# Patient Record
Sex: FEMALE | Race: WHITE | NOT HISPANIC OR LATINO
[De-identification: names, ages, dates, MRNs, and addresses within clinical notes are randomized per-mention and may not be internally consistent; named-entity substitution may affect disease eponyms.]

---

## 2019-08-03 ENCOUNTER — TRANSCRIPTION ENCOUNTER (OUTPATIENT)
Age: 84
End: 2019-08-03

## 2019-09-05 ENCOUNTER — OUTPATIENT (OUTPATIENT)
Dept: OUTPATIENT SERVICES | Facility: HOSPITAL | Age: 84
LOS: 1 days | Discharge: HOME | End: 2019-09-05

## 2019-09-05 DIAGNOSIS — E78.1 PURE HYPERGLYCERIDEMIA: ICD-10-CM

## 2019-09-05 DIAGNOSIS — K81.9 CHOLECYSTITIS, UNSPECIFIED: ICD-10-CM

## 2019-09-05 DIAGNOSIS — N39.0 URINARY TRACT INFECTION, SITE NOT SPECIFIED: ICD-10-CM

## 2019-09-05 DIAGNOSIS — D64.9 ANEMIA, UNSPECIFIED: ICD-10-CM

## 2019-09-05 DIAGNOSIS — E03.9 HYPOTHYROIDISM, UNSPECIFIED: ICD-10-CM

## 2019-09-06 ENCOUNTER — OUTPATIENT (OUTPATIENT)
Dept: OUTPATIENT SERVICES | Facility: HOSPITAL | Age: 84
LOS: 1 days | Discharge: HOME | End: 2019-09-06

## 2019-09-06 DIAGNOSIS — N39.0 URINARY TRACT INFECTION, SITE NOT SPECIFIED: ICD-10-CM

## 2021-10-29 ENCOUNTER — INPATIENT (INPATIENT)
Facility: HOSPITAL | Age: 86
LOS: 9 days | Discharge: NURSING, REHAB OR LONG-TERM CARE | End: 2021-11-08
Attending: INTERNAL MEDICINE | Admitting: INTERNAL MEDICINE
Payer: MEDICARE

## 2021-10-29 VITALS
RESPIRATION RATE: 16 BRPM | OXYGEN SATURATION: 100 % | SYSTOLIC BLOOD PRESSURE: 177 MMHG | TEMPERATURE: 98 F | WEIGHT: 100.09 LBS | DIASTOLIC BLOOD PRESSURE: 77 MMHG | HEART RATE: 94 BPM

## 2021-10-29 LAB
ALBUMIN SERPL ELPH-MCNC: 4.5 G/DL — SIGNIFICANT CHANGE UP (ref 3.5–5.2)
ALP SERPL-CCNC: 57 U/L — SIGNIFICANT CHANGE UP (ref 30–115)
ALT FLD-CCNC: 12 U/L — SIGNIFICANT CHANGE UP (ref 0–41)
ANION GAP SERPL CALC-SCNC: 13 MMOL/L — SIGNIFICANT CHANGE UP (ref 7–14)
APTT BLD: 29.7 SEC — SIGNIFICANT CHANGE UP (ref 27–39.2)
AST SERPL-CCNC: 33 U/L — SIGNIFICANT CHANGE UP (ref 0–41)
BASOPHILS # BLD AUTO: 0.04 K/UL — SIGNIFICANT CHANGE UP (ref 0–0.2)
BASOPHILS NFR BLD AUTO: 0.3 % — SIGNIFICANT CHANGE UP (ref 0–1)
BILIRUB SERPL-MCNC: 0.6 MG/DL — SIGNIFICANT CHANGE UP (ref 0.2–1.2)
BLD GP AB SCN SERPL QL: SIGNIFICANT CHANGE UP
BUN SERPL-MCNC: 14 MG/DL — SIGNIFICANT CHANGE UP (ref 10–20)
CALCIUM SERPL-MCNC: 9.3 MG/DL — SIGNIFICANT CHANGE UP (ref 8.5–10.1)
CHLORIDE SERPL-SCNC: 103 MMOL/L — SIGNIFICANT CHANGE UP (ref 98–110)
CO2 SERPL-SCNC: 21 MMOL/L — SIGNIFICANT CHANGE UP (ref 17–32)
CREAT SERPL-MCNC: 0.8 MG/DL — SIGNIFICANT CHANGE UP (ref 0.7–1.5)
EOSINOPHIL # BLD AUTO: 0.12 K/UL — SIGNIFICANT CHANGE UP (ref 0–0.7)
EOSINOPHIL NFR BLD AUTO: 0.9 % — SIGNIFICANT CHANGE UP (ref 0–8)
ETHANOL SERPL-MCNC: <10 MG/DL — SIGNIFICANT CHANGE UP
GLUCOSE SERPL-MCNC: 174 MG/DL — HIGH (ref 70–99)
HCT VFR BLD CALC: 42.1 % — SIGNIFICANT CHANGE UP (ref 37–47)
HGB BLD-MCNC: 13.7 G/DL — SIGNIFICANT CHANGE UP (ref 12–16)
IMM GRANULOCYTES NFR BLD AUTO: 0.5 % — HIGH (ref 0.1–0.3)
INR BLD: 0.94 RATIO — SIGNIFICANT CHANGE UP (ref 0.65–1.3)
LACTATE SERPL-SCNC: 1.9 MMOL/L — SIGNIFICANT CHANGE UP (ref 0.7–2)
LIDOCAIN IGE QN: 49 U/L — SIGNIFICANT CHANGE UP (ref 7–60)
LYMPHOCYTES # BLD AUTO: 1.04 K/UL — LOW (ref 1.2–3.4)
LYMPHOCYTES # BLD AUTO: 7.6 % — LOW (ref 20.5–51.1)
MCHC RBC-ENTMCNC: 29.8 PG — SIGNIFICANT CHANGE UP (ref 27–31)
MCHC RBC-ENTMCNC: 32.5 G/DL — SIGNIFICANT CHANGE UP (ref 32–37)
MCV RBC AUTO: 91.7 FL — SIGNIFICANT CHANGE UP (ref 81–99)
MONOCYTES # BLD AUTO: 0.42 K/UL — SIGNIFICANT CHANGE UP (ref 0.1–0.6)
MONOCYTES NFR BLD AUTO: 3.1 % — SIGNIFICANT CHANGE UP (ref 1.7–9.3)
NEUTROPHILS # BLD AUTO: 12.07 K/UL — HIGH (ref 1.4–6.5)
NEUTROPHILS NFR BLD AUTO: 87.6 % — HIGH (ref 42.2–75.2)
NRBC # BLD: 0 /100 WBCS — SIGNIFICANT CHANGE UP (ref 0–0)
PLATELET # BLD AUTO: 243 K/UL — SIGNIFICANT CHANGE UP (ref 130–400)
POTASSIUM SERPL-MCNC: 5.2 MMOL/L — HIGH (ref 3.5–5)
POTASSIUM SERPL-SCNC: 5.2 MMOL/L — HIGH (ref 3.5–5)
PROT SERPL-MCNC: 7.2 G/DL — SIGNIFICANT CHANGE UP (ref 6–8)
PROTHROM AB SERPL-ACNC: 10.8 SEC — SIGNIFICANT CHANGE UP (ref 9.95–12.87)
RBC # BLD: 4.59 M/UL — SIGNIFICANT CHANGE UP (ref 4.2–5.4)
RBC # FLD: 13.7 % — SIGNIFICANT CHANGE UP (ref 11.5–14.5)
SODIUM SERPL-SCNC: 137 MMOL/L — SIGNIFICANT CHANGE UP (ref 135–146)
WBC # BLD: 13.76 K/UL — HIGH (ref 4.8–10.8)
WBC # FLD AUTO: 13.76 K/UL — HIGH (ref 4.8–10.8)

## 2021-10-29 PROCEDURE — 93010 ELECTROCARDIOGRAM REPORT: CPT

## 2021-10-29 PROCEDURE — 71045 X-RAY EXAM CHEST 1 VIEW: CPT | Mod: 26

## 2021-10-29 PROCEDURE — 73552 X-RAY EXAM OF FEMUR 2/>: CPT | Mod: 26,LT

## 2021-10-29 PROCEDURE — 72192 CT PELVIS W/O DYE: CPT | Mod: 26,MA

## 2021-10-29 PROCEDURE — 99285 EMERGENCY DEPT VISIT HI MDM: CPT

## 2021-10-29 PROCEDURE — 73502 X-RAY EXAM HIP UNI 2-3 VIEWS: CPT | Mod: 26,LT

## 2021-10-29 PROCEDURE — 99221 1ST HOSP IP/OBS SF/LOW 40: CPT | Mod: GC,AI

## 2021-10-29 RX ORDER — MORPHINE SULFATE 50 MG/1
4 CAPSULE, EXTENDED RELEASE ORAL ONCE
Refills: 0 | Status: DISCONTINUED | OUTPATIENT
Start: 2021-10-29 | End: 2021-10-29

## 2021-10-29 RX ORDER — SODIUM CHLORIDE 9 MG/ML
250 INJECTION INTRAMUSCULAR; INTRAVENOUS; SUBCUTANEOUS ONCE
Refills: 0 | Status: COMPLETED | OUTPATIENT
Start: 2021-10-29 | End: 2021-10-29

## 2021-10-29 RX ORDER — TRAMADOL HYDROCHLORIDE 50 MG/1
50 TABLET ORAL EVERY 6 HOURS
Refills: 0 | Status: DISCONTINUED | OUTPATIENT
Start: 2021-10-29 | End: 2021-11-01

## 2021-10-29 RX ORDER — LISINOPRIL 2.5 MG/1
5 TABLET ORAL DAILY
Refills: 0 | Status: DISCONTINUED | OUTPATIENT
Start: 2021-10-29 | End: 2021-11-01

## 2021-10-29 RX ORDER — ACETAMINOPHEN 500 MG
1000 TABLET ORAL EVERY 6 HOURS
Refills: 0 | Status: DISCONTINUED | OUTPATIENT
Start: 2021-10-29 | End: 2021-10-30

## 2021-10-29 RX ORDER — HEPARIN SODIUM 5000 [USP'U]/ML
5000 INJECTION INTRAVENOUS; SUBCUTANEOUS EVERY 12 HOURS
Refills: 0 | Status: DISCONTINUED | OUTPATIENT
Start: 2021-10-29 | End: 2021-11-01

## 2021-10-29 RX ADMIN — MORPHINE SULFATE 4 MILLIGRAM(S): 50 CAPSULE, EXTENDED RELEASE ORAL at 21:53

## 2021-10-29 RX ADMIN — SODIUM CHLORIDE 250 MILLILITER(S): 9 INJECTION INTRAMUSCULAR; INTRAVENOUS; SUBCUTANEOUS at 20:40

## 2021-10-29 NOTE — ED ADULT NURSE NOTE - OBJECTIVE STATEMENT
89 y/o female presents to ED s/p fall. pt found unable to get up. pt denies LOC, head injury, or use of anticoagulants. pt c/o neck pain and left leg pain.

## 2021-10-29 NOTE — ED PROVIDER NOTE - OBJECTIVE STATEMENT
89y F with PMH of HTN presents with CC of fall. Patient reports that she was wearing old slippers, and slipped on her right leg and landed on her left side. Complaining of left hip pain. No head trauma, no LOC, no chest pain, no SOB. no abdominal pain, No N/V/D.

## 2021-10-29 NOTE — H&P ADULT - NSHPPHYSICALEXAM_GEN_ALL_CORE
GEN: NAD, Well Appearing, Well nourished  HEENT: NCAT, PERRL, EOMI, No Icterus, No Pallor, No nystagmus, Vision Intact, No JVD/TD  CV/CHEST: RRR, +S1/S2, no murmurs  PULM: CTAB, Good Air Entry Bilaterally  ABD: SNTTP, ND x 4 Q's  EXT: LLE shortened slightly externally rotated, EXT otherwise are Warm, Well Perfused x 4. No Edema  SKIN: No Rash  NEURO: AxOx3, + Normal Affect

## 2021-10-29 NOTE — H&P ADULT - ATTENDING COMMENTS
REVIEW OF SYSTEMS: see cc/HPI   CONSTITUTIONAL: No weakness, fevers or chills, (+) pain   EYES/ENT: No visual changes;  No vertigo or throat pain   NECK: No pain or stiffness  RESPIRATORY: No cough, wheezing, hemoptysis; No shortness of breath  CARDIOVASCULAR: No chest pain or palpitations  GASTROINTESTINAL: No abdominal or epigastric pain. No nausea, vomiting, or hematemesis; No diarrhea or constipation. No melena or hematochezia.  GENITOURINARY: No dysuria, frequency or hematuria  NEUROLOGICAL: No numbness or weakness  SKIN: No itching, rashes    Functional Capacity - patient fully independent, drives, shops/carries groceries to car and has Fx capacity > 4 METS      Physical Exam:  General: WN/WD NAD, elderly   Neurology: A&Ox3, nonfocal, follows commands  Eyes: PERRLA/ EOMI  ENT/Neck: Neck supple, trachea midline, No JVD  Respiratory: CTA B/L, No wheezing, rales, rhonchi  CV: Normal rate regular rhythm, S1S2, no murmurs, rubs or gallops  Abdominal: Soft, NT, ND +BS,   Extremities: No edema, + peripheral pulses, LLE externally rotated and shortened, pain w/ movement   Skin: No Rashes, Hematoma, Ecchymosis  Incisions:   Tubes:    A/p  L hip fracture   Mechanical fall  -patient is at moderate risk svetlana-op risk for complication while undergoing an intermediate risk procedure - no contra-indication for surgery and no further testing needed   -PRN pain Rx   -NPO after MN Sunday for OR Monday  -NWB / Fall precautions for now    -Ortho eval and follow up   -PT/ Rehab eval REVIEW OF SYSTEMS: see cc/HPI   CONSTITUTIONAL: No weakness, fevers or chills, (+) pain   EYES/ENT: No visual changes;  No vertigo or throat pain   NECK: No pain or stiffness  RESPIRATORY: No cough, wheezing, hemoptysis; No shortness of breath  CARDIOVASCULAR: No chest pain or palpitations  GASTROINTESTINAL: No abdominal or epigastric pain. No nausea, vomiting, or hematemesis; No diarrhea or constipation. No melena or hematochezia.  GENITOURINARY: No dysuria, frequency or hematuria  NEUROLOGICAL: No numbness or weakness  SKIN: No itching, rashes    Functional Capacity - patient fully independent, drives, shops/carries groceries to car and has Fx capacity > 4 METS    < from: Xray Chest 1 View AP/PA (10.29.21 @ 21:13) >  Impression:  No radiographic evidence of acute cardiopulmonary disease.  --- End of Report ---  < end of copied text >    < from: Xray Hip 2-3 Views, Left (10.29.21 @ 21:23) >  IMPRESSION:  Acute, displaced left femoral neck fracture.  --- End of Report ---  < end of copied text >    Physical Exam:  General: WN/WD NAD, elderly   Neurology: A&Ox3, nonfocal, follows commands  Eyes: PERRLA/ EOMI  ENT/Neck: Neck supple, trachea midline, No JVD  Respiratory: CTA B/L, No wheezing, rales, rhonchi  CV: Normal rate regular rhythm, S1S2, no murmurs, rubs or gallops  Abdominal: Soft, NT, ND +BS,   Extremities: No edema, + peripheral pulses, LLE externally rotated and shortened, pain w/ movement   Skin: No Rashes, Hematoma, Ecchymosis  Incisions:   Tubes:    A/p  L hip fracture   Mechanical fall  -patient is at moderate risk svetlana-op risk for complication while undergoing an intermediate risk procedure - no contra-indication for surgery and no further testing needed   -PRN pain Rx   -NPO after MN Sunday for OR Monday  -NWB / Fall precautions for now    -Ortho eval and follow up   -PT/ Rehab eval    DVT prophylaxis

## 2021-10-29 NOTE — H&P ADULT - ASSESSMENT
ASSESSMENT  # Mechanical Fall  # L Femur Fx  # HTN    PLAN  # Mechanical Fall  # L Femur Fx  - ORIF Monday, NPO After Midnight  - Preop Labs  - Ghislaine Periop VALENTIN Score 0.3%  - Scanlon Postop Resp Failure Risk 0.3%  - RCRI 3.9 %  - Patient is Moderate Risk for a Moderate Risk Procedure (Age, HTN)    # HTN  -     CODE:  DIET:  DVT PPX:   GI PPX:  DISPO: ASSESSMENT  # Mechanical Fall  # L Femur Fx  # HTN    PLAN  # Mechanical Fall  # L Femur Fx  - ORIF Monday, NPO After Midnight  - Preop Labs  - Ghislaine Periop VALENTIN Score 0.3%  - Scanlon Postop Resp Failure Risk 0.3%  - RCRI 3.9 %  - Patient is Moderate Risk for a Moderate Risk Procedure (Age, HTN)  - PT consult placed, NWB LLE, WBAT RLE  - Ortho consult   - Tylenol 1000mg Q6hrs PRN for mild-mod pain  - Tramadol 50mg Q6hrs PRN for Sever pain    # HTN  - Lisinopril 5mg QD    CODE: FULL  DIET: DASH/TLC  DVT PPX: HSQ  GI PPX: n/a  DISPO: Acute     Discussed w/son of pt Dr. CHAR Harrington (orthopaedic surgeon) he is coming down to [facility] from [facility] tomorrow

## 2021-10-29 NOTE — ED ADULT TRIAGE NOTE - CHIEF COMPLAINT QUOTE
pt s/p single mechanical fall landing on L side. (-) head trauma, (-) LOC, (-) A/C. pt pelvis stable on exam, pt c/o L femoral head/neck pain to palpation in triage.

## 2021-10-29 NOTE — ED PROVIDER NOTE - PHYSICAL EXAMINATION
VITALS: Reviewed  CONSTITUTIONAL: well developed, well nourished, in no acute distress, speaking in full sentences, nontoxic appearing  SKIN: warm, dry, no rash  HEAD: normocephalic, atraumatic  EYES: PERRL, EOMI, no conjunctival erythema, sclera clear  ENT: patent airway, moist mucous membranes  NECK: supple, no masses  CV:  regular rate, regular rhythm, 2+ radial pulses bilaterally  RESP: no wheezes, no rales, no rhonchi, normal work of breathing  ABD: soft, nontender, nondistended, no rebound, no guarding  MSK: normal ROM, no cyanosis, no edema-pedal pulses 2+ palpable B/L; L hip pain to palpation, no ecchymosis, +contusion, LLE externally rotated and shortened, limited ROM  NEURO: alert, oriented x3  PSYCH: cooperative, appropriate

## 2021-10-29 NOTE — H&P ADULT - HISTORY OF PRESENT ILLNESS
HPI: 89y F with PMH of HTN presents with CC of fall. Patient reports that she was wearing old slippers, and slipped on her right leg and landed on her left side. Complaining of left hip pain. No head trauma, no LOC, denies prodrome of CP, palpitations, dizziness, unsteady gait. no abdominal pain, No N/V/D. Admitted for ORIF w/ortho Monday.     ED COURSE: found to have Acute, minimally displaced subcapital fracture of the left femoral neck with varus angulation.

## 2021-10-29 NOTE — ED ADULT NURSE NOTE - NSFALLRSKHARMRISK_ED_ALL_ED
Pt met criteria for discharge-Pt hemodynamically stable at present- VSS- Pt denies Pain/numbness and tingling- no abnormal neuro assessment- Report given to 8 azucena nurse-Pt left unit via bed on IVF and room air accompanied by 2 PCAs
yes

## 2021-10-30 LAB
ALBUMIN SERPL ELPH-MCNC: 3.9 G/DL — SIGNIFICANT CHANGE UP (ref 3.5–5.2)
ALP SERPL-CCNC: 48 U/L — SIGNIFICANT CHANGE UP (ref 30–115)
ALT FLD-CCNC: 10 U/L — SIGNIFICANT CHANGE UP (ref 0–41)
ANION GAP SERPL CALC-SCNC: 11 MMOL/L — SIGNIFICANT CHANGE UP (ref 7–14)
APPEARANCE UR: ABNORMAL
APTT BLD: 29 SEC — SIGNIFICANT CHANGE UP (ref 27–39.2)
AST SERPL-CCNC: 17 U/L — SIGNIFICANT CHANGE UP (ref 0–41)
BACTERIA # UR AUTO: ABNORMAL
BASOPHILS # BLD AUTO: 0.01 K/UL — SIGNIFICANT CHANGE UP (ref 0–0.2)
BASOPHILS NFR BLD AUTO: 0.1 % — SIGNIFICANT CHANGE UP (ref 0–1)
BILIRUB SERPL-MCNC: 0.9 MG/DL — SIGNIFICANT CHANGE UP (ref 0.2–1.2)
BILIRUB UR-MCNC: NEGATIVE — SIGNIFICANT CHANGE UP
BUN SERPL-MCNC: 13 MG/DL — SIGNIFICANT CHANGE UP (ref 10–20)
CALCIUM SERPL-MCNC: 8.9 MG/DL — SIGNIFICANT CHANGE UP (ref 8.5–10.1)
CHLORIDE SERPL-SCNC: 102 MMOL/L — SIGNIFICANT CHANGE UP (ref 98–110)
CO2 SERPL-SCNC: 23 MMOL/L — SIGNIFICANT CHANGE UP (ref 17–32)
COLOR SPEC: YELLOW — SIGNIFICANT CHANGE UP
COVID-19 SPIKE DOMAIN AB INTERP: POSITIVE
COVID-19 SPIKE DOMAIN ANTIBODY RESULT: >250 U/ML — HIGH
CREAT SERPL-MCNC: 0.7 MG/DL — SIGNIFICANT CHANGE UP (ref 0.7–1.5)
DIFF PNL FLD: SIGNIFICANT CHANGE UP
EOSINOPHIL # BLD AUTO: 0 K/UL — SIGNIFICANT CHANGE UP (ref 0–0.7)
EOSINOPHIL NFR BLD AUTO: 0 % — SIGNIFICANT CHANGE UP (ref 0–8)
EPI CELLS # UR: 7 /HPF — HIGH (ref 0–5)
GLUCOSE SERPL-MCNC: 158 MG/DL — HIGH (ref 70–99)
GLUCOSE UR QL: SIGNIFICANT CHANGE UP
HCT VFR BLD CALC: 34.8 % — LOW (ref 37–47)
HGB BLD-MCNC: 11.5 G/DL — LOW (ref 12–16)
HYALINE CASTS # UR AUTO: 3 /LPF — SIGNIFICANT CHANGE UP (ref 0–7)
IMM GRANULOCYTES NFR BLD AUTO: 0.4 % — HIGH (ref 0.1–0.3)
INR BLD: 1.08 RATIO — SIGNIFICANT CHANGE UP (ref 0.65–1.3)
KETONES UR-MCNC: ABNORMAL
LEUKOCYTE ESTERASE UR-ACNC: ABNORMAL
LYMPHOCYTES # BLD AUTO: 0.7 K/UL — LOW (ref 1.2–3.4)
LYMPHOCYTES # BLD AUTO: 6.2 % — LOW (ref 20.5–51.1)
MAGNESIUM SERPL-MCNC: 1.9 MG/DL — SIGNIFICANT CHANGE UP (ref 1.8–2.4)
MCHC RBC-ENTMCNC: 30.2 PG — SIGNIFICANT CHANGE UP (ref 27–31)
MCHC RBC-ENTMCNC: 33 G/DL — SIGNIFICANT CHANGE UP (ref 32–37)
MCV RBC AUTO: 91.3 FL — SIGNIFICANT CHANGE UP (ref 81–99)
MONOCYTES # BLD AUTO: 0.35 K/UL — SIGNIFICANT CHANGE UP (ref 0.1–0.6)
MONOCYTES NFR BLD AUTO: 3.1 % — SIGNIFICANT CHANGE UP (ref 1.7–9.3)
NEUTROPHILS # BLD AUTO: 10.21 K/UL — HIGH (ref 1.4–6.5)
NEUTROPHILS NFR BLD AUTO: 90.2 % — HIGH (ref 42.2–75.2)
NITRITE UR-MCNC: NEGATIVE — SIGNIFICANT CHANGE UP
NRBC # BLD: 0 /100 WBCS — SIGNIFICANT CHANGE UP (ref 0–0)
PH UR: 6 — SIGNIFICANT CHANGE UP (ref 5–8)
PHOSPHATE SERPL-MCNC: 3 MG/DL — SIGNIFICANT CHANGE UP (ref 2.1–4.9)
PLATELET # BLD AUTO: 200 K/UL — SIGNIFICANT CHANGE UP (ref 130–400)
POTASSIUM SERPL-MCNC: 4 MMOL/L — SIGNIFICANT CHANGE UP (ref 3.5–5)
POTASSIUM SERPL-SCNC: 4 MMOL/L — SIGNIFICANT CHANGE UP (ref 3.5–5)
PROT SERPL-MCNC: 5.9 G/DL — LOW (ref 6–8)
PROT UR-MCNC: ABNORMAL
PROTHROM AB SERPL-ACNC: 12.4 SEC — SIGNIFICANT CHANGE UP (ref 9.95–12.87)
RBC # BLD: 3.81 M/UL — LOW (ref 4.2–5.4)
RBC # FLD: 13.8 % — SIGNIFICANT CHANGE UP (ref 11.5–14.5)
RBC CASTS # UR COMP ASSIST: 3 /HPF — SIGNIFICANT CHANGE UP (ref 0–4)
SARS-COV-2 IGG+IGM SERPL QL IA: >250 U/ML — HIGH
SARS-COV-2 IGG+IGM SERPL QL IA: POSITIVE
SARS-COV-2 RNA SPEC QL NAA+PROBE: SIGNIFICANT CHANGE UP
SODIUM SERPL-SCNC: 136 MMOL/L — SIGNIFICANT CHANGE UP (ref 135–146)
SP GR SPEC: 1.02 — SIGNIFICANT CHANGE UP (ref 1.01–1.03)
UROBILINOGEN FLD QL: SIGNIFICANT CHANGE UP
WBC # BLD: 11.32 K/UL — HIGH (ref 4.8–10.8)
WBC # FLD AUTO: 11.32 K/UL — HIGH (ref 4.8–10.8)
WBC UR QL: 83 /HPF — HIGH (ref 0–5)

## 2021-10-30 PROCEDURE — 73560 X-RAY EXAM OF KNEE 1 OR 2: CPT | Mod: 26,LT

## 2021-10-30 PROCEDURE — 99232 SBSQ HOSP IP/OBS MODERATE 35: CPT

## 2021-10-30 RX ORDER — ACETAMINOPHEN 500 MG
1000 TABLET ORAL EVERY 6 HOURS
Refills: 0 | Status: DISCONTINUED | OUTPATIENT
Start: 2021-10-30 | End: 2021-10-30

## 2021-10-30 RX ORDER — SENNA PLUS 8.6 MG/1
2 TABLET ORAL AT BEDTIME
Refills: 0 | Status: DISCONTINUED | OUTPATIENT
Start: 2021-10-30 | End: 2021-11-01

## 2021-10-30 RX ORDER — OXYCODONE AND ACETAMINOPHEN 5; 325 MG/1; MG/1
1 TABLET ORAL EVERY 6 HOURS
Refills: 0 | Status: DISCONTINUED | OUTPATIENT
Start: 2021-10-30 | End: 2021-11-01

## 2021-10-30 RX ORDER — MORPHINE SULFATE 50 MG/1
2 CAPSULE, EXTENDED RELEASE ORAL EVERY 6 HOURS
Refills: 0 | Status: DISCONTINUED | OUTPATIENT
Start: 2021-10-30 | End: 2021-11-01

## 2021-10-30 RX ORDER — POLYETHYLENE GLYCOL 3350 17 G/17G
17 POWDER, FOR SOLUTION ORAL DAILY
Refills: 0 | Status: DISCONTINUED | OUTPATIENT
Start: 2021-10-30 | End: 2021-11-01

## 2021-10-30 RX ORDER — ACETAMINOPHEN 500 MG
650 TABLET ORAL ONCE
Refills: 0 | Status: COMPLETED | OUTPATIENT
Start: 2021-10-30 | End: 2021-10-30

## 2021-10-30 RX ADMIN — LISINOPRIL 5 MILLIGRAM(S): 2.5 TABLET ORAL at 07:30

## 2021-10-30 RX ADMIN — Medication 650 MILLIGRAM(S): at 18:45

## 2021-10-30 RX ADMIN — HEPARIN SODIUM 5000 UNIT(S): 5000 INJECTION INTRAVENOUS; SUBCUTANEOUS at 17:11

## 2021-10-30 RX ADMIN — POLYETHYLENE GLYCOL 3350 17 GRAM(S): 17 POWDER, FOR SOLUTION ORAL at 11:22

## 2021-10-30 RX ADMIN — HEPARIN SODIUM 5000 UNIT(S): 5000 INJECTION INTRAVENOUS; SUBCUTANEOUS at 06:29

## 2021-10-30 NOTE — PROGRESS NOTE ADULT - ASSESSMENT
89y F with PMH of HTN presents with CC of fall, found to have left hip fracture.     # Acute, displaced left femoral neck fracture 2/2 mechanical fall:   - pt is hemodynamically stable.  - xray finding noted  - seen by ortho, plan for ORIF Monday, NPO After Midnight  - Preop Labs  - Patient is Moderate Risk for a Moderate Risk Procedure (Age, HTN)  - PT consult placed, NWB LLE, WBAT RLE  - Tylenol 1000mg Q6hrs PRN for mild-mod pain  - Tramadol 50mg Q6hrs PRN for Sever pain    # HTN- Lisinopril 5mg QD    CODE: FULL  DIET: DASH/TLC, NPO sunray night   DVT PPX: HSQ  GI PPX: n/a  DISPO: Acute      89y F with PMH of HTN presents with CC of fall, found to have left hip fracture.     # Acute, displaced left femoral neck fracture 2/2 mechanical fall:   - pt is hemodynamically stable.  - xray finding noted  - seen by ortho, plan for ORIF Monday, NPO After Midnight  - Preop Labs  - Patient is Moderate Risk for a Moderate Risk Procedure (Age, HTN)  - PT consult placed, NWB LLE, WBAT RLE  - Tylenol 1000mg Q6hrs PRN for mild-mod pain  - Tramadol 50mg Q6hrs PRN for Sever pain    # Acute anemia:  - Hb 11.5, MCV 91  - CT noted with left gluteal muscle hematoma, difficult to measure  - active type and screen and monitor    # Asymp bacteruria:   - + UA, denies symp, monitor for now     # HTN- Lisinopril 5mg QD    CODE: FULL  DIET: DASH/TLC, NPO sunray night   DVT PPX: HSQ  GI PPX: n/a  DISPO: Acute

## 2021-10-30 NOTE — PATIENT PROFILE ADULT - 
ADDITIONAL INFORMATION
Patients states she took her last dose in April 2021 but doesn't remember what day. Her family will bring in the information from home

## 2021-10-30 NOTE — CONSULT NOTE ADULT - ASSESSMENT
ORTHOPEDIC SURGERY CONSULT    89y Female here with L hip pain s/p fall today. Patient is a community ambulator at baseline without assistive devices.  Denies numbness/tingling.  Denies f/c/n/v/cp/sob.  Denies pain elsewhere    Medications:  acetaminophen     Tablet .. 1000 milliGRAM(s) Oral every 6 hours PRN  heparin   Injectable 5000 Unit(s) SubCutaneous every 12 hours  lisinopril 5 milliGRAM(s) Oral daily  traMADol 50 milliGRAM(s) Oral every 6 hours PRN    Allergy Status Unknown      PMH/PSH:  HTN         Exam:  T(C): 35.9 (10-30-21 @ 00:29), Max: 36.9 (10-29-21 @ 19:40)  HR: 93 (10-30-21 @ 00:29) (93 - 94)  BP: 133/60 (10-30-21 @ 00:29) (133/60 - 177/77)  RR: 18 (10-30-21 @ 00:29) (16 - 18)  SpO2: 96% (10-30-21 @ 00:29) (96% - 100%)  General: Awake, alert, NAD, AAOx3    Pelvis stable    LLE: Short, externally rotated. Pain w/ log roll/heel strike SILT s/s/sp/dp/t.  Motor intact EHL, TA, Gastroc.  No ecchymosis or gross deformity.  Palpable DP, PT pulses  Remainder of MSK exam reveals no acute deformity or other areas of pain    Labs:                        13.7   13.76 )-----------( 243      ( 29 Oct 2021 19:57 )             42.1     10-29    137  |  103  |  14  ----------------------------<  174<H>  5.2<H>   |  21  |  0.8    Ca    9.3      29 Oct 2021 19:57    TPro  7.2  /  Alb  4.5  /  TBili  0.6  /  DBili  x   /  AST  33  /  ALT  12  /  AlkPhos  57  10-29    PT/INR - ( 29 Oct 2021 19:57 )   PT: 10.80 sec;   INR: 0.94 ratio         PTT - ( 29 Oct 2021 19:57 )  PTT:29.7 sec    Imaging: Left displaced subcapital femoral neck fracture     A/P:  89yFemale with left displaced subcapital femoral neck fracture    - NWB LLE  - Medical clearance  - Add on for Monday 11/1/21, for L hip hemiarthroplasty   - Risks, benefits and alternatives have been discussed and the patient/family are in agreement with surgical intervention  - DVT PPX  - NPO Sunday night  - Ortho to follow

## 2021-10-30 NOTE — PHYSICAL THERAPY INITIAL EVALUATION ADULT - DID THE PATIENT HAVE SURGERY?
...cont . surgery. Pt was very receptive to PT and demonstrated full understanding. Will hold PT initial evaluation until after sx completed.

## 2021-10-31 PROCEDURE — 99233 SBSQ HOSP IP/OBS HIGH 50: CPT

## 2021-10-31 RX ORDER — CEFTRIAXONE 500 MG/1
1000 INJECTION, POWDER, FOR SOLUTION INTRAMUSCULAR; INTRAVENOUS EVERY 24 HOURS
Refills: 0 | Status: DISCONTINUED | OUTPATIENT
Start: 2021-10-31 | End: 2021-11-01

## 2021-10-31 RX ORDER — SODIUM CHLORIDE 9 MG/ML
1000 INJECTION INTRAMUSCULAR; INTRAVENOUS; SUBCUTANEOUS
Refills: 0 | Status: DISCONTINUED | OUTPATIENT
Start: 2021-10-31 | End: 2021-11-01

## 2021-10-31 RX ADMIN — LISINOPRIL 5 MILLIGRAM(S): 2.5 TABLET ORAL at 06:19

## 2021-10-31 RX ADMIN — SODIUM CHLORIDE 75 MILLILITER(S): 9 INJECTION INTRAMUSCULAR; INTRAVENOUS; SUBCUTANEOUS at 17:18

## 2021-10-31 RX ADMIN — HEPARIN SODIUM 5000 UNIT(S): 5000 INJECTION INTRAVENOUS; SUBCUTANEOUS at 17:20

## 2021-10-31 RX ADMIN — SENNA PLUS 2 TABLET(S): 8.6 TABLET ORAL at 21:08

## 2021-10-31 RX ADMIN — POLYETHYLENE GLYCOL 3350 17 GRAM(S): 17 POWDER, FOR SOLUTION ORAL at 12:41

## 2021-10-31 RX ADMIN — CEFTRIAXONE 100 MILLIGRAM(S): 500 INJECTION, POWDER, FOR SOLUTION INTRAMUSCULAR; INTRAVENOUS at 17:19

## 2021-10-31 RX ADMIN — HEPARIN SODIUM 5000 UNIT(S): 5000 INJECTION INTRAVENOUS; SUBCUTANEOUS at 06:19

## 2021-10-31 NOTE — PHYSICAL THERAPY INITIAL EVALUATION ADULT - SPECIFY REASON(S)
Pt transferred from [place] - awaiting hip hemiarthroplasty tomorrow 11/1 with Dr. Matamoros, due to L femoral neck fx. PT will follow up post-op.
Pt is pre op for possible left hip ORIF on Monday 11/1. Pt. approached at bedside to discuss plan of care and instruct safety and several therapeutic exercises , bed mobility strategies while awaiting

## 2021-10-31 NOTE — PROGRESS NOTE ADULT - ASSESSMENT
89y F with PMH of HTN presents with CC of fall, found to have left hip fracture.       A/P   # Acute, displaced left femoral neck fracture/ s/p  mechanical fall:   - seen by ortho, plan for ORIF Monday  - NPO After Midnight  - Preop Labs  - PT consult placed, NWB LLE, WBAT RLE  - Tylenol 1000mg Q6hrs PRN for mild-mod pain  - Tramadol 50mg Q6hrs PRN for Sever pain    #  anemia  - pt has a soft tissue hematoma   - Hb 11.5, MCV 91  - monitor H/H, keep Hb above 8.0   - active type and screen and monitor    #  bacteruria/ low grade fever   - start Rocephin empirically  - f/u urine Cx     # HTN  - DASH diet   - Lisinopril 5mg QD    CODE: FULL  DIET: DASH/TLC, NPO after midnight    DVT PPX: HSQ  GI PPX: n/a    #Progress Note Handoff  Pending (specify):  start Rocephin, f/u urine Cx, NPO after midnight, gently IV hydration while NPO, OR in AM _  Family discussion: I spoke with pt, she agreed with a plan of care   Disposition: Home___/SNF___/Other________/Unknown at this time____x ____

## 2021-11-01 LAB
ALBUMIN SERPL ELPH-MCNC: 3.4 G/DL — LOW (ref 3.5–5.2)
ALP SERPL-CCNC: 46 U/L — SIGNIFICANT CHANGE UP (ref 30–115)
ALT FLD-CCNC: 10 U/L — SIGNIFICANT CHANGE UP (ref 0–41)
ANION GAP SERPL CALC-SCNC: 8 MMOL/L — SIGNIFICANT CHANGE UP (ref 7–14)
AST SERPL-CCNC: 16 U/L — SIGNIFICANT CHANGE UP (ref 0–41)
BILIRUB SERPL-MCNC: 0.6 MG/DL — SIGNIFICANT CHANGE UP (ref 0.2–1.2)
BUN SERPL-MCNC: 11 MG/DL — SIGNIFICANT CHANGE UP (ref 10–20)
CALCIUM SERPL-MCNC: 8.2 MG/DL — LOW (ref 8.5–10.1)
CHLORIDE SERPL-SCNC: 105 MMOL/L — SIGNIFICANT CHANGE UP (ref 98–110)
CK MB CFR SERPL CALC: 3.6 NG/ML — SIGNIFICANT CHANGE UP (ref 0.6–6.3)
CO2 SERPL-SCNC: 24 MMOL/L — SIGNIFICANT CHANGE UP (ref 17–32)
CREAT SERPL-MCNC: 0.6 MG/DL — LOW (ref 0.7–1.5)
GLUCOSE BLDC GLUCOMTR-MCNC: 164 MG/DL — HIGH (ref 70–99)
GLUCOSE SERPL-MCNC: 121 MG/DL — HIGH (ref 70–99)
HCT VFR BLD CALC: 33 % — LOW (ref 37–47)
HGB BLD-MCNC: 10.8 G/DL — LOW (ref 12–16)
MAGNESIUM SERPL-MCNC: 1.9 MG/DL — SIGNIFICANT CHANGE UP (ref 1.8–2.4)
MCHC RBC-ENTMCNC: 30 PG — SIGNIFICANT CHANGE UP (ref 27–31)
MCHC RBC-ENTMCNC: 32.7 G/DL — SIGNIFICANT CHANGE UP (ref 32–37)
MCV RBC AUTO: 91.7 FL — SIGNIFICANT CHANGE UP (ref 81–99)
NRBC # BLD: 0 /100 WBCS — SIGNIFICANT CHANGE UP (ref 0–0)
PLATELET # BLD AUTO: 191 K/UL — SIGNIFICANT CHANGE UP (ref 130–400)
POTASSIUM SERPL-MCNC: 3.8 MMOL/L — SIGNIFICANT CHANGE UP (ref 3.5–5)
POTASSIUM SERPL-SCNC: 3.8 MMOL/L — SIGNIFICANT CHANGE UP (ref 3.5–5)
PROT SERPL-MCNC: 5.1 G/DL — LOW (ref 6–8)
RBC # BLD: 3.6 M/UL — LOW (ref 4.2–5.4)
RBC # FLD: 13.5 % — SIGNIFICANT CHANGE UP (ref 11.5–14.5)
SODIUM SERPL-SCNC: 137 MMOL/L — SIGNIFICANT CHANGE UP (ref 135–146)
TROPONIN T SERPL-MCNC: 0.15 NG/ML — CRITICAL HIGH
TROPONIN T SERPL-MCNC: <0.01 NG/ML — SIGNIFICANT CHANGE UP
WBC # BLD: 7.92 K/UL — SIGNIFICANT CHANGE UP (ref 4.8–10.8)
WBC # FLD AUTO: 7.92 K/UL — SIGNIFICANT CHANGE UP (ref 4.8–10.8)

## 2021-11-01 PROCEDURE — 88311 DECALCIFY TISSUE: CPT | Mod: 26

## 2021-11-01 PROCEDURE — 99233 SBSQ HOSP IP/OBS HIGH 50: CPT

## 2021-11-01 PROCEDURE — 72170 X-RAY EXAM OF PELVIS: CPT | Mod: 26

## 2021-11-01 PROCEDURE — 93010 ELECTROCARDIOGRAM REPORT: CPT

## 2021-11-01 PROCEDURE — 88305 TISSUE EXAM BY PATHOLOGIST: CPT | Mod: 26

## 2021-11-01 RX ORDER — ENOXAPARIN SODIUM 100 MG/ML
40 INJECTION SUBCUTANEOUS
Refills: 0 | Status: DISCONTINUED | OUTPATIENT
Start: 2021-11-01 | End: 2021-11-04

## 2021-11-01 RX ORDER — SODIUM CHLORIDE 9 MG/ML
1000 INJECTION, SOLUTION INTRAVENOUS
Refills: 0 | Status: DISCONTINUED | OUTPATIENT
Start: 2021-11-01 | End: 2021-11-01

## 2021-11-01 RX ORDER — PANTOPRAZOLE SODIUM 20 MG/1
40 TABLET, DELAYED RELEASE ORAL
Refills: 0 | Status: DISCONTINUED | OUTPATIENT
Start: 2021-11-01 | End: 2021-11-08

## 2021-11-01 RX ORDER — SENNA PLUS 8.6 MG/1
2 TABLET ORAL AT BEDTIME
Refills: 0 | Status: DISCONTINUED | OUTPATIENT
Start: 2021-11-01 | End: 2021-11-08

## 2021-11-01 RX ORDER — ENOXAPARIN SODIUM 100 MG/ML
40 INJECTION SUBCUTANEOUS ONCE
Refills: 0 | Status: COMPLETED | OUTPATIENT
Start: 2021-11-02 | End: 2021-11-01

## 2021-11-01 RX ORDER — SODIUM CHLORIDE 9 MG/ML
1000 INJECTION INTRAMUSCULAR; INTRAVENOUS; SUBCUTANEOUS
Refills: 0 | Status: DISCONTINUED | OUTPATIENT
Start: 2021-11-01 | End: 2021-11-02

## 2021-11-01 RX ORDER — ONDANSETRON 8 MG/1
4 TABLET, FILM COATED ORAL ONCE
Refills: 0 | Status: DISCONTINUED | OUTPATIENT
Start: 2021-11-01 | End: 2021-11-01

## 2021-11-01 RX ORDER — METOPROLOL TARTRATE 50 MG
12.5 TABLET ORAL
Refills: 0 | Status: DISCONTINUED | OUTPATIENT
Start: 2021-11-01 | End: 2021-11-08

## 2021-11-01 RX ORDER — NITROGLYCERIN 6.5 MG
0.4 CAPSULE, EXTENDED RELEASE ORAL
Refills: 0 | Status: DISCONTINUED | OUTPATIENT
Start: 2021-11-01 | End: 2021-11-08

## 2021-11-01 RX ORDER — TRAMADOL HYDROCHLORIDE 50 MG/1
50 TABLET ORAL EVERY 6 HOURS
Refills: 0 | Status: DISCONTINUED | OUTPATIENT
Start: 2021-11-01 | End: 2021-11-01

## 2021-11-01 RX ORDER — ENOXAPARIN SODIUM 100 MG/ML
40 INJECTION SUBCUTANEOUS
Refills: 0 | Status: DISCONTINUED | OUTPATIENT
Start: 2021-11-01 | End: 2021-11-01

## 2021-11-01 RX ORDER — CHLORHEXIDINE GLUCONATE 213 G/1000ML
1 SOLUTION TOPICAL
Refills: 0 | Status: DISCONTINUED | OUTPATIENT
Start: 2021-11-01 | End: 2021-11-08

## 2021-11-01 RX ORDER — ACETAMINOPHEN 500 MG
650 TABLET ORAL EVERY 6 HOURS
Refills: 0 | Status: COMPLETED | OUTPATIENT
Start: 2021-11-01 | End: 2021-11-04

## 2021-11-01 RX ORDER — CELECOXIB 200 MG/1
200 CAPSULE ORAL DAILY
Refills: 0 | Status: DISCONTINUED | OUTPATIENT
Start: 2021-11-02 | End: 2021-11-08

## 2021-11-01 RX ORDER — LISINOPRIL 2.5 MG/1
5 TABLET ORAL DAILY
Refills: 0 | Status: DISCONTINUED | OUTPATIENT
Start: 2021-11-01 | End: 2021-11-08

## 2021-11-01 RX ORDER — ONDANSETRON 8 MG/1
4 TABLET, FILM COATED ORAL EVERY 6 HOURS
Refills: 0 | Status: DISCONTINUED | OUTPATIENT
Start: 2021-11-01 | End: 2021-11-08

## 2021-11-01 RX ORDER — CEFAZOLIN SODIUM 1 G
2000 VIAL (EA) INJECTION EVERY 8 HOURS
Refills: 0 | Status: COMPLETED | OUTPATIENT
Start: 2021-11-01 | End: 2021-11-02

## 2021-11-01 RX ORDER — ASPIRIN/CALCIUM CARB/MAGNESIUM 324 MG
81 TABLET ORAL
Refills: 0 | Status: DISCONTINUED | OUTPATIENT
Start: 2021-11-01 | End: 2021-11-08

## 2021-11-01 RX ORDER — KETOROLAC TROMETHAMINE 30 MG/ML
15 SYRINGE (ML) INJECTION EVERY 6 HOURS
Refills: 0 | Status: DISCONTINUED | OUTPATIENT
Start: 2021-11-01 | End: 2021-11-02

## 2021-11-01 RX ORDER — HYDROMORPHONE HYDROCHLORIDE 2 MG/ML
0.5 INJECTION INTRAMUSCULAR; INTRAVENOUS; SUBCUTANEOUS
Refills: 0 | Status: DISCONTINUED | OUTPATIENT
Start: 2021-11-01 | End: 2021-11-01

## 2021-11-01 RX ADMIN — ENOXAPARIN SODIUM 40 MILLIGRAM(S): 100 INJECTION SUBCUTANEOUS at 23:07

## 2021-11-01 RX ADMIN — Medication 650 MILLIGRAM(S): at 19:01

## 2021-11-01 RX ADMIN — Medication 100 MILLIGRAM(S): at 21:07

## 2021-11-01 RX ADMIN — LISINOPRIL 5 MILLIGRAM(S): 2.5 TABLET ORAL at 05:50

## 2021-11-01 RX ADMIN — Medication 650 MILLIGRAM(S): at 18:50

## 2021-11-01 RX ADMIN — Medication 15 MILLIGRAM(S): at 19:01

## 2021-11-01 RX ADMIN — SODIUM CHLORIDE 75 MILLILITER(S): 9 INJECTION, SOLUTION INTRAVENOUS at 16:48

## 2021-11-01 RX ADMIN — Medication 15 MILLIGRAM(S): at 18:50

## 2021-11-01 RX ADMIN — Medication 81 MILLIGRAM(S): at 18:50

## 2021-11-01 RX ADMIN — SODIUM CHLORIDE 75 MILLILITER(S): 9 INJECTION INTRAMUSCULAR; INTRAVENOUS; SUBCUTANEOUS at 05:51

## 2021-11-01 RX ADMIN — SODIUM CHLORIDE 75 MILLILITER(S): 9 INJECTION INTRAMUSCULAR; INTRAVENOUS; SUBCUTANEOUS at 19:00

## 2021-11-01 NOTE — CHART NOTE - NSCHARTNOTEFT_GEN_A_CORE
Patient complaining of Chest Heaviness SP ORIF Femur.  VS Stable Will order 12 Lead EKG Patient complaining of Chest Heaviness SP ORIF Femur.  VS Stable Will order 12 Lead EKG    EKG reviewed, SR at 88, new ST depressions in lateral leads. Troponins and CKMB ordered.    Patient reports her primary cardiologist is Dr. Dubose, call placed to service. Patient complaining of Chest Heaviness SP ORIF Femur.  VS Stable Will order 12 Lead EKG    EKG reviewed, SR at 88, new ST depressions in lateral leads. Troponins and CKMB ordered.    Patient reports her primary cardiologist is Dr. Dubose, call placed and message left. Patient complaining of Chest Heaviness SP ORIF Femur.  VS Stable Will order 12 Lead EKG    EKG reviewed, SR at 88, new ST depressions in lateral leads. Troponins and CKMB ordered.    Patient reports her primary cardiologist is Dr. Dubose, call placed and message left. Discussed with patient's hospitalist Dr. Malloy as well.

## 2021-11-01 NOTE — CHART NOTE - NSCHARTNOTEFT_GEN_A_CORE
Called for elevation of troponin to 0.15. Patient seen at bedside, tells me she feels fine, able to ambulate and denying any chest pain. Has received 40 mg dose of Lovenox at 2300. At this time will continue dosing ever 12 hours while continuing aspirin and metoprolol as previously ordered

## 2021-11-01 NOTE — CHART NOTE - NSCHARTNOTEFT_GEN_A_CORE
PACU ANESTHESIA ADMISSION NOTE      Procedure: Left hip arthroplasty  Post op diagnosis:  Left hip fracture    ____  Intubated  TV:______       Rate: ______      FiO2: ______    __x__  Patent Airway    _x___  Full return of protective reflexes    __x__  Full recovery from anesthesia / back to baseline status    Vitals:  T(C): 36.5 (11-01-21 @ 13:17), Max: 37.6 (11-01-21 @ 05:17)  HR: 91 (11-01-21 @ 13:17) (75 - 94)  BP: 155/70 (11-01-21 @ 13:17) (134/73 - 155/70)  RR: 16 (11-01-21 @ 13:17) (16 - 16)  SpO2: --    Mental Status:  _x___ Awake   __x___ Alert   _____ Drowsy   _____ Sedated    Nausea/Vomiting:  ____ NO  ____x__Yes,   See Post - Op Orders          Pain Scale (0-10):  _____    Treatment: ____ None    _x___ See Post - Op/PCA Orders    Post - Operative Fluids:   ____ Oral   _x___ See Post - Op Orders    Plan: Discharge:   ____Home       __x___Floor     _____Critical Care    _____  Other:_________________    Comments: uneventful anesthesia course no complications. VItals stable. Pt transferred to PACU

## 2021-11-01 NOTE — PROGRESS NOTE ADULT - ASSESSMENT
89y F with PMH of HTN presents with CC of fall, found to have left hip fracture.       A/P   # Acute, displaced left femoral neck fracture/ s/p  mechanical fall:   - seen by ortho, plan for ORIF today   - NPO   - Tylenol 1000mg Q6hrs PRN for mild-mod pain  - Tramadol 50mg Q6hrs PRN for Sever pain    #  anemia  - pt has a soft tissue hematoma   - Hb 11.5, MCV 91  - monitor H/H, keep Hb above 8.0   - active type and screen and monitor    #  bacteruria/ low grade fever   - started on  Rocephin empirically on 10/31  - f/u urine Cx     # HTN  - DASH diet   - Lisinopril 5mg QD    CODE: FULL  DIET: DASH/TLC, NPO after midnight    DVT PPX: HSQ  GI PPX: n/a    #Progress Note Handoff  Pending (specify):  NPO for OR today  Family discussion: I spoke with pt and her son at [facility], son is ortho Sx DR Keena Durand [phone] they  agreed with a plan of care   Disposition: Home___/SNF___/Other________/Unknown at this time____x ____

## 2021-11-01 NOTE — ANESTHESIA FOLLOW-UP NOTE - NSEVALATIONFT_GEN_ALL_CORE
Pt in bed resting comfortably, states she no longer is feeling CP or discomfort- cardiac enzymes drawn by RN.

## 2021-11-01 NOTE — CHART NOTE - NSCHARTNOTEFT_GEN_A_CORE
I was called by anesthesiology attending, shortly after procedure pt c/o chest discomfort with EKG changes, will check cardiac enzymes, put pt on telemetry monitoring, serial EKGs, consult cardiology, f/u 2Decho, will start BB, pt is on ASA, start Nitro PRN for chest pain, will monitor closely.

## 2021-11-02 LAB
ANION GAP SERPL CALC-SCNC: 7 MMOL/L — SIGNIFICANT CHANGE UP (ref 7–14)
BLD GP AB SCN SERPL QL: SIGNIFICANT CHANGE UP
BUN SERPL-MCNC: 15 MG/DL — SIGNIFICANT CHANGE UP (ref 10–20)
CALCIUM SERPL-MCNC: 8.2 MG/DL — LOW (ref 8.5–10.1)
CHLORIDE SERPL-SCNC: 106 MMOL/L — SIGNIFICANT CHANGE UP (ref 98–110)
CHOLEST SERPL-MCNC: 115 MG/DL — SIGNIFICANT CHANGE UP
CK MB CFR SERPL CALC: 16 NG/ML — HIGH (ref 0.6–6.3)
CK SERPL-CCNC: 321 U/L — HIGH (ref 0–225)
CO2 SERPL-SCNC: 24 MMOL/L — SIGNIFICANT CHANGE UP (ref 17–32)
CREAT SERPL-MCNC: 0.7 MG/DL — SIGNIFICANT CHANGE UP (ref 0.7–1.5)
GLUCOSE SERPL-MCNC: 125 MG/DL — HIGH (ref 70–99)
HCT VFR BLD CALC: 26.6 % — LOW (ref 37–47)
HCT VFR BLD CALC: 27.1 % — LOW (ref 37–47)
HCT VFR BLD CALC: 27.9 % — LOW (ref 37–47)
HCT VFR BLD CALC: 28.8 % — LOW (ref 37–47)
HDLC SERPL-MCNC: 37 MG/DL — LOW
HGB BLD-MCNC: 8.8 G/DL — LOW (ref 12–16)
HGB BLD-MCNC: 9 G/DL — LOW (ref 12–16)
HGB BLD-MCNC: 9.1 G/DL — LOW (ref 12–16)
HGB BLD-MCNC: 9.4 G/DL — LOW (ref 12–16)
LIPID PNL WITH DIRECT LDL SERPL: 61 MG/DL — SIGNIFICANT CHANGE UP
MCHC RBC-ENTMCNC: 30.5 PG — SIGNIFICANT CHANGE UP (ref 27–31)
MCHC RBC-ENTMCNC: 30.5 PG — SIGNIFICANT CHANGE UP (ref 27–31)
MCHC RBC-ENTMCNC: 30.8 PG — SIGNIFICANT CHANGE UP (ref 27–31)
MCHC RBC-ENTMCNC: 31 PG — SIGNIFICANT CHANGE UP (ref 27–31)
MCHC RBC-ENTMCNC: 32.6 G/DL — SIGNIFICANT CHANGE UP (ref 32–37)
MCHC RBC-ENTMCNC: 32.6 G/DL — SIGNIFICANT CHANGE UP (ref 32–37)
MCHC RBC-ENTMCNC: 33.1 G/DL — SIGNIFICANT CHANGE UP (ref 32–37)
MCHC RBC-ENTMCNC: 33.2 G/DL — SIGNIFICANT CHANGE UP (ref 32–37)
MCV RBC AUTO: 93 FL — SIGNIFICANT CHANGE UP (ref 81–99)
MCV RBC AUTO: 93.4 FL — SIGNIFICANT CHANGE UP (ref 81–99)
MCV RBC AUTO: 93.5 FL — SIGNIFICANT CHANGE UP (ref 81–99)
MCV RBC AUTO: 93.6 FL — SIGNIFICANT CHANGE UP (ref 81–99)
NON HDL CHOLESTEROL: 78 MG/DL — SIGNIFICANT CHANGE UP
NRBC # BLD: 0 /100 WBCS — SIGNIFICANT CHANGE UP (ref 0–0)
PLATELET # BLD AUTO: 186 K/UL — SIGNIFICANT CHANGE UP (ref 130–400)
PLATELET # BLD AUTO: 189 K/UL — SIGNIFICANT CHANGE UP (ref 130–400)
PLATELET # BLD AUTO: 211 K/UL — SIGNIFICANT CHANGE UP (ref 130–400)
PLATELET # BLD AUTO: 214 K/UL — SIGNIFICANT CHANGE UP (ref 130–400)
POTASSIUM SERPL-MCNC: 4 MMOL/L — SIGNIFICANT CHANGE UP (ref 3.5–5)
POTASSIUM SERPL-SCNC: 4 MMOL/L — SIGNIFICANT CHANGE UP (ref 3.5–5)
RBC # BLD: 2.86 M/UL — LOW (ref 4.2–5.4)
RBC # BLD: 2.9 M/UL — LOW (ref 4.2–5.4)
RBC # BLD: 2.98 M/UL — LOW (ref 4.2–5.4)
RBC # BLD: 3.08 M/UL — LOW (ref 4.2–5.4)
RBC # FLD: 13.4 % — SIGNIFICANT CHANGE UP (ref 11.5–14.5)
RBC # FLD: 13.6 % — SIGNIFICANT CHANGE UP (ref 11.5–14.5)
RBC # FLD: 13.7 % — SIGNIFICANT CHANGE UP (ref 11.5–14.5)
RBC # FLD: 13.7 % — SIGNIFICANT CHANGE UP (ref 11.5–14.5)
SODIUM SERPL-SCNC: 137 MMOL/L — SIGNIFICANT CHANGE UP (ref 135–146)
TRIGL SERPL-MCNC: 92 MG/DL — SIGNIFICANT CHANGE UP
TROPONIN T SERPL-MCNC: 0.21 NG/ML — CRITICAL HIGH
TROPONIN T SERPL-MCNC: 0.23 NG/ML — CRITICAL HIGH
WBC # BLD: 12.73 K/UL — HIGH (ref 4.8–10.8)
WBC # BLD: 13.05 K/UL — HIGH (ref 4.8–10.8)
WBC # BLD: 14.92 K/UL — HIGH (ref 4.8–10.8)
WBC # BLD: 9.67 K/UL — SIGNIFICANT CHANGE UP (ref 4.8–10.8)
WBC # FLD AUTO: 12.73 K/UL — HIGH (ref 4.8–10.8)
WBC # FLD AUTO: 13.05 K/UL — HIGH (ref 4.8–10.8)
WBC # FLD AUTO: 14.92 K/UL — HIGH (ref 4.8–10.8)
WBC # FLD AUTO: 9.67 K/UL — SIGNIFICANT CHANGE UP (ref 4.8–10.8)

## 2021-11-02 PROCEDURE — 93306 TTE W/DOPPLER COMPLETE: CPT | Mod: 26

## 2021-11-02 PROCEDURE — 99233 SBSQ HOSP IP/OBS HIGH 50: CPT

## 2021-11-02 PROCEDURE — 99223 1ST HOSP IP/OBS HIGH 75: CPT

## 2021-11-02 PROCEDURE — 93010 ELECTROCARDIOGRAM REPORT: CPT

## 2021-11-02 RX ORDER — SODIUM CHLORIDE 9 MG/ML
1000 INJECTION INTRAMUSCULAR; INTRAVENOUS; SUBCUTANEOUS ONCE
Refills: 0 | Status: COMPLETED | OUTPATIENT
Start: 2021-11-02 | End: 2021-11-02

## 2021-11-02 RX ADMIN — Medication 650 MILLIGRAM(S): at 09:32

## 2021-11-02 RX ADMIN — Medication 81 MILLIGRAM(S): at 17:29

## 2021-11-02 RX ADMIN — Medication 650 MILLIGRAM(S): at 17:29

## 2021-11-02 RX ADMIN — ENOXAPARIN SODIUM 40 MILLIGRAM(S): 100 INJECTION SUBCUTANEOUS at 22:04

## 2021-11-02 RX ADMIN — Medication 650 MILLIGRAM(S): at 01:16

## 2021-11-02 RX ADMIN — Medication 15 MILLIGRAM(S): at 00:47

## 2021-11-02 RX ADMIN — Medication 81 MILLIGRAM(S): at 05:28

## 2021-11-02 RX ADMIN — Medication 12.5 MILLIGRAM(S): at 05:28

## 2021-11-02 RX ADMIN — Medication 650 MILLIGRAM(S): at 17:58

## 2021-11-02 RX ADMIN — Medication 15 MILLIGRAM(S): at 01:16

## 2021-11-02 RX ADMIN — ONDANSETRON 4 MILLIGRAM(S): 8 TABLET, FILM COATED ORAL at 22:20

## 2021-11-02 RX ADMIN — SODIUM CHLORIDE 1000 MILLILITER(S): 9 INJECTION INTRAMUSCULAR; INTRAVENOUS; SUBCUTANEOUS at 12:57

## 2021-11-02 RX ADMIN — LISINOPRIL 5 MILLIGRAM(S): 2.5 TABLET ORAL at 05:28

## 2021-11-02 RX ADMIN — ENOXAPARIN SODIUM 40 MILLIGRAM(S): 100 INJECTION SUBCUTANEOUS at 13:59

## 2021-11-02 RX ADMIN — Medication 100 MILLIGRAM(S): at 05:28

## 2021-11-02 RX ADMIN — SENNA PLUS 2 TABLET(S): 8.6 TABLET ORAL at 21:30

## 2021-11-02 RX ADMIN — Medication 12.5 MILLIGRAM(S): at 17:29

## 2021-11-02 RX ADMIN — SODIUM CHLORIDE 75 MILLILITER(S): 9 INJECTION INTRAMUSCULAR; INTRAVENOUS; SUBCUTANEOUS at 13:58

## 2021-11-02 RX ADMIN — Medication 650 MILLIGRAM(S): at 00:46

## 2021-11-02 RX ADMIN — ONDANSETRON 4 MILLIGRAM(S): 8 TABLET, FILM COATED ORAL at 12:22

## 2021-11-02 NOTE — CONSULT NOTE ADULT - SUBJECTIVE AND OBJECTIVE BOX
HPI:  HPI: 89y F with PMH of HTN presents with CC of fall. Patient reports that she was wearing old slippers, and slipped on her right leg and landed on her left side. Complaining of left hip pain. No head trauma, no LOC, denies prodrome of CP, palpitations, dizziness, unsteady gait. no abdominal pain, No N/V/D. Admitted for ORIF w/ortho Monday.     ED COURSE: found to have Acute, minimally displaced subcapital fracture of the left femoral neck with varus angulation.    (29 Oct 2021 23:11)      HPI-Cardiology   Pt evaluated at bedside with Dr Contreras. Currently admitted in telemetry s/p ortho Sx. Radiology tests and hospital records, were reviewed, as well as previous notes on this patient. Pt states that she felt some pressure on her chest last night. Cardiac enzymes were sent and trop 0.15. Currently Pt denies any chest pain or sob.       PAST MEDICAL & SURGICAL HISTORY  HTN, age 0-18        FAMILY HISTORY:  FAMILY HISTORY:      SOCIAL HISTORY:  []smoker-denies  []Alcohol-denies  []Drug-denies    ALLERGIES:  NKDA (Unknown)  Shrimp (Rash)      MEDICATIONS:  MEDICATIONS  (STANDING):  acetaminophen     Tablet .. 650 milliGRAM(s) Oral every 6 hours  aspirin  chewable 81 milliGRAM(s) Oral two times a day  celecoxib 200 milliGRAM(s) Oral daily  chlorhexidine 4% Liquid 1 Application(s) Topical <User Schedule>  enoxaparin Injectable 40 milliGRAM(s) SubCutaneous <User Schedule>  ketorolac   Injectable 15 milliGRAM(s) IV Push every 6 hours  lisinopril 5 milliGRAM(s) Oral daily  metoprolol tartrate 12.5 milliGRAM(s) Oral two times a day  pantoprazole    Tablet 40 milliGRAM(s) Oral before breakfast  senna 2 Tablet(s) Oral at bedtime  sodium chloride 0.9% Bolus 1000 milliLiter(s) IV Bolus once  sodium chloride 0.9%. 1000 milliLiter(s) (75 mL/Hr) IV Continuous <Continuous>    MEDICATIONS  (PRN):  nitroglycerin     SubLingual 0.4 milliGRAM(s) SubLingual every 5 minutes PRN Chest Pain  ondansetron Injectable 4 milliGRAM(s) IV Push every 6 hours PRN Nausea and/or Vomiting  traMADol 50 milliGRAM(s) Oral every 6 hours PRN Severe Pain (7 - 10)      HOME MEDICATIONS:  Home Medications:  lisinopril 5 mg oral tablet: 1 tab(s) orally once a day (29 Oct 2021 23:51)      VITALS:   T(F): 98.7 (11-02 @ 08:30), Max: 100.6 (10-30 @ 18:24)  HR: 90 (11-02 @ 08:30) (62 - 97)  BP: 138/61 (11-02 @ 08:30) (85/51 - 155/70)  BP(mean): --  RR: 16 (11-02 @ 08:30) (15 - 18)  SpO2: 95% (11-02 @ 10:09) (95% - 98%)    I&O's Summary    01 Nov 2021 07:01  -  02 Nov 2021 07:00  --------------------------------------------------------  IN: 1350 mL / OUT: 1200 mL / NET: 150 mL        REVIEW OF SYSTEMS:  CONSTITUTIONAL: No weakness, fevers or chills  EYES: No visual changes  ENT: No vertigo or throat pain   NECK: No pain or stiffness  RESPIRATORY: No cough, wheezing, hemoptysis; No shortness of breath  CARDIOVASCULAR: No chest pain or palpitations  GASTROINTESTINAL: No abdominal or epigastric pain. No nausea, vomiting, or hematemesis; No diarrhea or constipation. No melena or hematochezia.  GENITOURINARY: No dysuria, frequency or hematuria  NEUROLOGICAL: No numbness or weakness  SKIN: No itching, no rashes  MSK: no    PHYSICAL EXAM:  NEURO: patient is awake , alert and oriented  GEN: Not in acute distress  NECK: no thyroid enlargement, no JVD  LUNGS: Clear to auscultation bilaterally   CARDIOVASCULAR: S1/S2 present, RRR , no murmurs or rubs, no carotid bruits,  + PP bilaterally  ABD: Soft, non-tender, non-distended, +BS  EXT: No TRAVIS  SKIN: Intact    LABS:                        8.8    9.67  )-----------( 189      ( 02 Nov 2021 07:24 )             26.6     11-02    137  |  106  |  15  ----------------------------<  125<H>  4.0   |  24  |  0.7    Ca    8.2<L>      02 Nov 2021 07:24  Mg     1.9     11-01    TPro  5.1<L>  /  Alb  3.4<L>  /  TBili  0.6  /  DBili  x   /  AST  16  /  ALT  10  /  AlkPhos  46  11-01      Troponin T, Serum: 0.21 ng/mL *HH* (11-02-21 @ 07:24)  Troponin T, Serum: 0.15 ng/mL ** (11-01-21 @ 21:32)  Troponin T, Serum: <0.01 ng/mL (11-01-21 @ 17:50)    CARDIAC MARKERS ( 02 Nov 2021 07:24 )  x     / 0.21 ng/mL / x     / x     / x      CARDIAC MARKERS ( 01 Nov 2021 21:32 )  x     / 0.15 ng/mL / x     / x     / x      CARDIAC MARKERS ( 01 Nov 2021 17:50 )  x     / <0.01 ng/mL / x     / x     / 3.6 ng/mL          RADIOLOGY:  -CXR:  < from: Xray Chest 1 View AP/PA (10.29.21 @ 21:13) >    Impression:    No radiographic evidence of acute cardiopulmonary disease.        --- End of Report ---    < end of copied text >

## 2021-11-02 NOTE — CONSULT NOTE ADULT - ASSESSMENT
IMPRESSION: Rehab of 89 y/o  f  rehab  for lt hip  fx sp  orif gd  sp  fall    PRECAUTIONS: [  ] Cardiac  [  ] Respiratory  [  ] Seizures [  ] Contact Isolation  [  ] Droplet Isolation  [ FALL ] Other    Weight Bearing Status:     RECOMMENDATION:    Out of Bed to Chair     DVT/Decubiti Prophylaxis    REHAB PLAN:     [  x ] Bedside P/T 3-5 times a week   [ x ]   Bedside O/T  2-3 times a week             [   ] No Rehab Therapy Indicated                   [   ]  Speech Therapy   Conditioning/ROM                                    ADL  Bed Mobility                                               Conditioning/ROM  Transfers                                                     Bed Mobility  Sitting /Standing Balance                         Transfers                                        Gait Training                                               Sitting/Standing Balance  Stair Training [   ]Applicable                    Home equipment Eval                                                                        Splinting  [   ] Only      GOALS:   ADL   [  x ]   Independent                    Transfers  [x   ] Independent                          Ambulation  [ x  ] Independent     [ x  ] With device                            [  x ]  CG                                                         [ x  ]  CG                                                                  [  x ] CG                            [    ] Min A                                                   [   ] Min A                                                              [   ] Min  A          DISCHARGE PLAN:   [   ]  Good candidate for Intensive Rehabilitation/Hospital based-[facility]                                             Will tolerate 3hrs Intensive Rehab Daily                                       [  xx  ]  Short Term Rehab in Skilled Nursing Facility                                       [    ]  Home with Outpatient or  services                                         [    ]  Possible Candidate for Intensive Hospital based Rehab                                        IMPRESSION: Rehab of 89 y/o  f  rehab  for lt hip  fx  sp  lt  thr  gd  sp  fall    PRECAUTIONS: [  ] Cardiac  [  ] Respiratory  [  ] Seizures [  ] Contact Isolation  [  ] Droplet Isolation  [ FALL ] Other    Weight Bearing Status:     RECOMMENDATION:    Out of Bed to Chair     DVT/Decubiti Prophylaxis    REHAB PLAN:     [  x ] Bedside P/T 3-5 times a week   [ x ]   Bedside O/T  2-3 times a week             [   ] No Rehab Therapy Indicated                   [   ]  Speech Therapy   Conditioning/ROM                                    ADL  Bed Mobility                                               Conditioning/ROM  Transfers                                                     Bed Mobility  Sitting /Standing Balance                         Transfers                                        Gait Training                                               Sitting/Standing Balance  Stair Training [   ]Applicable                    Home equipment Eval                                                                        Splinting  [   ] Only      GOALS:   ADL   [  x ]   Independent                    Transfers  [x   ] Independent                          Ambulation  [ x  ] Independent     [ x  ] With device                            [  x ]  CG                                                         [ x  ]  CG                                                                  [  x ] CG                            [    ] Min A                                                   [   ] Min A                                                              [   ] Min  A          DISCHARGE PLAN:   [   ]  Good candidate for Intensive Rehabilitation/Hospital based-[facility]                                             Will tolerate 3hrs Intensive Rehab Daily                                       [  xx  ]  Short Term Rehab in Skilled Nursing Facility                                       [    ]  Home with Outpatient or  services                                         [    ]  Possible Candidate for Intensive Hospital based Rehab

## 2021-11-02 NOTE — PROGRESS NOTE ADULT - TIME BILLING
direct pt's care, communication with medical team, ortho team and family
direct pt's care, communication with medical team
direct pt's care, communication with medical team, ortho team, son and cardiology team

## 2021-11-02 NOTE — PHYSICAL THERAPY INITIAL EVALUATION ADULT - ADDITIONAL COMMENTS
Pt reports she lives alone, and PTA, she amb without AD independently.   Pt says she has no steps to negotiate outside or inside house.

## 2021-11-02 NOTE — OCCUPATIONAL THERAPY INITIAL EVALUATION ADULT - GENERAL OBSERVATIONS, REHAB EVAL
chart reviewed per Dr. Pratt pt ok to be seen by OT bedside, + tele, + maxim sequentials and Jhonatan, pt agreeable to OT eval

## 2021-11-02 NOTE — CONSULT NOTE ADULT - ASSESSMENT
89y F with PMH of HTN was admitted s/p fall for hip Sx.       Impression:  #Chest Pressure-r/o ACS?  #HTN      Plan:  -C/w telemetry monitoring  -Troponin trending up 0.01-->0.15-->0.21. CKMB 3.6. Repeat CKMB. Trend trop x1 more  -Hgb trending down 11.5-->10.8-->8.8. Monitor H&H and transfuse PRN  -2d echo  -c/w lisinopril  -check lipid panel  -low fat DASH diet    Discussed with Dr Contreras

## 2021-11-02 NOTE — PHYSICAL THERAPY INITIAL EVALUATION ADULT - GENERAL OBSERVATIONS, REHAB EVAL
900-925 am Chart reviewed. Pt. seen semirecline in bed , in No apparent distress , + IV lock , c/o 2/10 pain on left hip.
8:30-9:00 Chart reviewed. Patient available to be seen for physical therapy, denies pain, confirmed with RN. Pt rec'd in bed + sequentials in place, +L hip Aquacel in place, no drainage noted.

## 2021-11-02 NOTE — DIETITIAN INITIAL EVALUATION ADULT. - OTHER INFO
pt is 89 year old female with hx of HTN present s/p mechanical fall + fx of L femoral neck with varus angulation. s/p total hip replacement 11/1/21.

## 2021-11-02 NOTE — DIETITIAN INITIAL EVALUATION ADULT. - PERTINENT MEDS FT
MEDICATIONS  (STANDING):  acetaminophen     Tablet .. 650 milliGRAM(s) Oral every 6 hours  aspirin  chewable 81 milliGRAM(s) Oral two times a day  celecoxib 200 milliGRAM(s) Oral daily  chlorhexidine 4% Liquid 1 Application(s) Topical <User Schedule>  enoxaparin Injectable 40 milliGRAM(s) SubCutaneous <User Schedule>  ketorolac   Injectable 15 milliGRAM(s) IV Push every 6 hours  lisinopril 5 milliGRAM(s) Oral daily  metoprolol tartrate 12.5 milliGRAM(s) Oral two times a day  pantoprazole    Tablet 40 milliGRAM(s) Oral before breakfast  senna 2 Tablet(s) Oral at bedtime  sodium chloride 0.9% Bolus 1000 milliLiter(s) IV Bolus once  sodium chloride 0.9%. 1000 milliLiter(s) (75 mL/Hr) IV Continuous <Continuous>    MEDICATIONS  (PRN):  nitroglycerin     SubLingual 0.4 milliGRAM(s) SubLingual every 5 minutes PRN Chest Pain  ondansetron Injectable 4 milliGRAM(s) IV Push every 6 hours PRN Nausea and/or Vomiting  traMADol 50 milliGRAM(s) Oral every 6 hours PRN Severe Pain (7 - 10)

## 2021-11-02 NOTE — CONSULT NOTE ADULT - ATTENDING COMMENTS
Patient with no complaints. BP low sitting. Need watch cbc. Transfuse if needed. She needs a echo. Further rx after above

## 2021-11-02 NOTE — PHYSICAL THERAPY INITIAL EVALUATION ADULT - IMPAIRMENTS CONTRIBUTING TO GAIT DEVIATIONS, PT EVAL
Pt became dizzy during amb to bathroom, required Min A to commode, RN Caroline called./decreased strength

## 2021-11-02 NOTE — PROGRESS NOTE ADULT - ASSESSMENT
89y F with PMH of HTN presents with CC of fall, found to have left hip fracture.    Pt underwent  ORIF on 11/1, shortly after procedure  she developed chest discomfort upgraded to telemetry with EKG changed, cardiac enzymes came back abnormal, pt was consulted by cardiology, currently on ASA, BB. In last 24 hours Hb dropped from 10.8 to 8.8 and pt developed orthostatic hypotension pt received one liter of NS as a bolus, repeat CBC at 11 Hb 9.4  Case d/w cardiology and pt's son who is an orthopedic surgeon, will upgraded to CCU.        A/P   # Demand ischemia/ troponemia  - upgrade to CCU as per cardiology  - telemetry monitoring  - repeat orthostatic VS today, pt was orthostatic this morning and recived NS 1L x one bolus   - on ASA, started on BB  - check lipid profile and consider statin   - no AC for now   - f/u repeat troponin   - serial EKGs   - monitor and replete electrolytes   - 2Decho noted ( normal EF, borderline PHTN)   - keep Hb above 8.0     # Acute blood loss anemia  - monitor H/H, keep Hb above 8.0 and active type/cross   - pt has ecchymoses around surgical site       # Acute, displaced left femoral neck fracture/ s/p  mechanical fall:   - s/p ORIF   - ortho follow up   - POD # 1 today  ORIF today   - Tylenol 1000mg Q6hrs PRN for mild-mod pain  - Tramadol 50mg Q6hrs PRN for Sever pain    #  bacteruria/ low grade fever   - on  Rocephin   - f/u urine Cx     # HTN  - DASH diet   - Lisinopril 5mg QD    CODE: FULL  DIET: DASH/TLC,  DVT PPX: HSQ  GI PPX: n/a    #Progress Note Handoff  Pending (specify): transfer to CCU, f/u cardiac enzymes, monitor H/H, keep Hb above 8.0, repeat ortho VS, c/w Abx, f/u urine Cx, ortho and cardio follow up   Family discussion: I spoke with pt and her son at length son is ortho Sx DR Keena Durand [phone] they  agreed with a plan of care   Disposition: upgraded to CCU

## 2021-11-02 NOTE — PHYSICAL THERAPY INITIAL EVALUATION ADULT - PERTINENT HX OF CURRENT PROBLEM, REHAB EVAL
89y F with PMH of HTN presents with CC of fall. Patient reports that she was wearing old slippers, and slipped on her right leg and landed on her left side. Complaining of left hip pain, sustained left subcapital femoral neck fx,   Admitted for ORIF w/ortho Monday.
s/p L MARIA VICTORIA, Hip Fracture

## 2021-11-02 NOTE — PHYSICAL THERAPY INITIAL EVALUATION ADULT - LEVEL OF INDEPENDENCE: SUPINE/SIT, REHAB EVAL
pt instructed therapeutic exercises, ankle pumps on B/L lower extremities; also therapeutic exercises in supine (quads setting, gluteal sets, ankle pumps, heel slides)/minimum assist (75% patients effort)
supervision

## 2021-11-02 NOTE — CHART NOTE - NSCHARTNOTEFT_GEN_A_CORE
Given Pt had marked st depressions, and elevated troponin, suggesting ischemic heart disease. Will transfer Pt to CCU-Tele.    Discussed with Dr Contreras

## 2021-11-02 NOTE — PHYSICAL THERAPY INITIAL EVALUATION ADULT - PREDICTED DURATION OF THERAPY (DAYS/WKS), PT EVAL
during hospitalization
Partial PT evaluation done; will re assess pt post op. Pt encouraged proper bed positioning, gentle ROM ex in bed.

## 2021-11-02 NOTE — CONSULT NOTE ADULT - SUBJECTIVE AND OBJECTIVE BOX
HPI: 89 yo F with PMH of HTN presents with CC of fall. Patient reports that she was wearing old slippers, and slipped on her right leg and landed on her left side. Complaining of left hip pain. No head trauma, no LOC, denies prodrome of CP, palpitations, dizziness, unsteady gait. no abdominal pain, No N/V/D. Admitted for ORIF w/ortho Monday.     ED COURSE: found to have Acute, minimally displaced subcapital fracture of the left femoral neck with varus angulation.   ptn  seen at  bed  side c/o  dizzness  but  aox3  nad  fu  command      PTN  REFERRED TO ACUTE  REHAB  FOR  EVAL AND  TX   PAST MEDICAL & SURGICAL HISTORY:  HTN, age 0-18        Hospital Course:    TODAY'S SUBJECTIVE & REVIEW OF SYMPTOMS:     Constitutional WNL   Cardio WNL   Resp WNL   GI WNL  Heme WNL  Endo WNL  Skin WNL  MSK WNL  Neuro WNL  Cognitive WNL  Psych WNL      MEDICATIONS  (STANDING):  acetaminophen     Tablet .. 650 milliGRAM(s) Oral every 6 hours  aspirin  chewable 81 milliGRAM(s) Oral two times a day  celecoxib 200 milliGRAM(s) Oral daily  chlorhexidine 4% Liquid 1 Application(s) Topical <User Schedule>  enoxaparin Injectable 40 milliGRAM(s) SubCutaneous <User Schedule>  ketorolac   Injectable 15 milliGRAM(s) IV Push every 6 hours  lisinopril 5 milliGRAM(s) Oral daily  metoprolol tartrate 12.5 milliGRAM(s) Oral two times a day  pantoprazole    Tablet 40 milliGRAM(s) Oral before breakfast  senna 2 Tablet(s) Oral at bedtime  sodium chloride 0.9%. 1000 milliLiter(s) (75 mL/Hr) IV Continuous <Continuous>    MEDICATIONS  (PRN):  nitroglycerin     SubLingual 0.4 milliGRAM(s) SubLingual every 5 minutes PRN Chest Pain  ondansetron Injectable 4 milliGRAM(s) IV Push every 6 hours PRN Nausea and/or Vomiting  traMADol 50 milliGRAM(s) Oral every 6 hours PRN Severe Pain (7 - 10)      FAMILY HISTORY:      Allergies    NKDA (Unknown)  Shrimp (Rash)    Intolerances        SOCIAL HISTORY:    [  ] Etoh  [  ] Smoking  [  ] Substance abuse     Home Environment:  [x  ] Home Alone  [  ] Lives with Family  [  ] Home Health Aid    Dwelling:  [  ] Apartment  [  x] Private House  [  ] Adult Home  [  ] Skilled Nursing Facility      [  ] Short Term  [  ] Long Term  [ x ] Stairs       Elevator [  ]    FUNCTIONAL STATUS PTA: (Check all that apply)  Ambulation: [  x ]Independent    [  ] Dependent     [  ] Non-Ambulatory  Assistive Device: [  ] SA Cane  [  ]  Q Cane  [  ] Walker  [  ]  Wheelchair  ADL : [  x] Independent  [  ]  Dependent       Vital Signs Last 24 Hrs  T(C): 37.1 (02 Nov 2021 08:30), Max: 37.3 (02 Nov 2021 00:05)  T(F): 98.7 (02 Nov 2021 08:30), Max: 99.2 (02 Nov 2021 00:05)  HR: 90 (02 Nov 2021 08:30) (62 - 97)  BP: 138/61 (02 Nov 2021 08:30) (85/51 - 155/70)  BP(mean): --  RR: 16 (02 Nov 2021 08:30) (15 - 18)  SpO2: 97% (01 Nov 2021 18:14) (95% - 98%)      PHYSICAL EXAM: Alert & Oriented X3  GENERAL: NAD, well-groomed, well-developed  HEAD:  Atraumatic, Normocephalic  EYES: EOMI, PERRLA, conjunctiva and sclera clear  NECK: Supple, No JVD, Normal thyroid  CHEST/LUNG: Clear to percussion bilaterally; No rales, rhonchi, wheezing, or rubs  HEART: Regular rate and rhythm; No murmurs, rubs, or gallops  ABDOMEN: Soft, Nontender, Nondistended; Bowel sounds present  EXTREMITIES:  2+ Peripheral Pulses, No clubbing, cyanosis, or edema    NERVOUS SYSTEM:  Cranial Nerves 2-12 intact [ x] Abnormal  [  ]  ROM: WFL all extremities [ x ]  Abnormal [  ]  Motor Strength: WFL all extremities  [ 4/5 bl  le   ]  Abnormal [  ]  Sensation: intact to light touch [ x ] Abnormal [  ]  Reflexes: Symmetric [ x ]  Abnormal [  ]    FUNCTIONAL STATUS:  Bed Mobility: Independent [  ]  Supervision [  ]  Needs Assistance [x  ]  N/A [  ]  Transfers: Independent [  ]  Supervision [  ]  Needs Assistance [ x ]  N/A [  ]   Ambulation: Independent [  ]  Supervision [  ]  Needs Assistance [ x ]  N/A [  ]  ADL: Independent [  ] Requires Assistance [  ] N/A [x  ]  SEE PT/OT IE NOTES    LABS:                        8.8    9.67  )-----------( 189      ( 02 Nov 2021 07:24 )             26.6     11-02    137  |  106  |  15  ----------------------------<  125<H>  4.0   |  24  |  0.7    Ca    8.2<L>      02 Nov 2021 07:24  Mg     1.9     11-01    TPro  5.1<L>  /  Alb  3.4<L>  /  TBili  0.6  /  DBili  x   /  AST  16  /  ALT  10  /  AlkPhos  46  11-01          RADIOLOGY & ADDITIONAL STUDIES:< from: Xray Hip 2-3 Views, Left (10.29.21 @ 21:23) >  Acute, displaced left femoral neck fracture. Osteopenia. Degenerative change of bilateral hips and lower lumbar spine.    IMPRESSION:  Acute, displaced left femoral neck fracture.    < end of copied text >      Assesment: HPI: 89 yo F with PMH of HTN presents with CC of fall. Patient reports that she was wearing old slippers, and slipped on her right leg and landed on her left side. Complaining of left hip pain. No head trauma, no LOC, denies prodrome of CP, palpitations, dizziness, unsteady gait. no abdominal pain, No N/V/D. Admitted for ORIF w/ortho Monday.     ED COURSE: found to have Acute, minimally displaced subcapital fracture of the left femoral neck with varus angulation.   ptn  seen at  bed  side c/o  dizzness  but  aox3  nad  fu  command  sp   lt  thr stable     PTN  REFERRED TO ACUTE  REHAB  FOR  EVAL AND  TX   PAST MEDICAL & SURGICAL HISTORY:  HTN, age 0-18        Hospital Course:    TODAY'S SUBJECTIVE & REVIEW OF SYMPTOMS:     Constitutional WNL   Cardio WNL   Resp WNL   GI WNL  Heme WNL  Endo WNL  Skin WNL  MSK WNL  Neuro WNL  Cognitive WNL  Psych WNL      MEDICATIONS  (STANDING):  acetaminophen     Tablet .. 650 milliGRAM(s) Oral every 6 hours  aspirin  chewable 81 milliGRAM(s) Oral two times a day  celecoxib 200 milliGRAM(s) Oral daily  chlorhexidine 4% Liquid 1 Application(s) Topical <User Schedule>  enoxaparin Injectable 40 milliGRAM(s) SubCutaneous <User Schedule>  ketorolac   Injectable 15 milliGRAM(s) IV Push every 6 hours  lisinopril 5 milliGRAM(s) Oral daily  metoprolol tartrate 12.5 milliGRAM(s) Oral two times a day  pantoprazole    Tablet 40 milliGRAM(s) Oral before breakfast  senna 2 Tablet(s) Oral at bedtime  sodium chloride 0.9%. 1000 milliLiter(s) (75 mL/Hr) IV Continuous <Continuous>    MEDICATIONS  (PRN):  nitroglycerin     SubLingual 0.4 milliGRAM(s) SubLingual every 5 minutes PRN Chest Pain  ondansetron Injectable 4 milliGRAM(s) IV Push every 6 hours PRN Nausea and/or Vomiting  traMADol 50 milliGRAM(s) Oral every 6 hours PRN Severe Pain (7 - 10)      FAMILY HISTORY:      Allergies    NKDA (Unknown)  Shrimp (Rash)    Intolerances        SOCIAL HISTORY:    [  ] Etoh  [  ] Smoking  [  ] Substance abuse     Home Environment:  [x  ] Home Alone  [  ] Lives with Family  [  ] Home Health Aid    Dwelling:  [  ] Apartment  [  x] Private House  [  ] Adult Home  [  ] Skilled Nursing Facility      [  ] Short Term  [  ] Long Term  [ x ] Stairs       Elevator [  ]    FUNCTIONAL STATUS PTA: (Check all that apply)  Ambulation: [  x ]Independent    [  ] Dependent     [  ] Non-Ambulatory  Assistive Device: [  ] SA Cane  [  ]  Q Cane  [  ] Walker  [  ]  Wheelchair  ADL : [  x] Independent  [  ]  Dependent       Vital Signs Last 24 Hrs  T(C): 37.1 (02 Nov 2021 08:30), Max: 37.3 (02 Nov 2021 00:05)  T(F): 98.7 (02 Nov 2021 08:30), Max: 99.2 (02 Nov 2021 00:05)  HR: 90 (02 Nov 2021 08:30) (62 - 97)  BP: 138/61 (02 Nov 2021 08:30) (85/51 - 155/70)  BP(mean): --  RR: 16 (02 Nov 2021 08:30) (15 - 18)  SpO2: 97% (01 Nov 2021 18:14) (95% - 98%)      PHYSICAL EXAM: Alert & Oriented X3  GENERAL: NAD, well-groomed, well-developed  HEAD:  Atraumatic, Normocephalic  EYES: EOMI, PERRLA, conjunctiva and sclera clear  NECK: Supple, No JVD, Normal thyroid  CHEST/LUNG: Clear to percussion bilaterally; No rales, rhonchi, wheezing, or rubs  HEART: Regular rate and rhythm; No murmurs, rubs, or gallops  ABDOMEN: Soft, Nontender, Nondistended; Bowel sounds present  EXTREMITIES:  2+ Peripheral Pulses, No clubbing, cyanosis, or edema    NERVOUS SYSTEM:  Cranial Nerves 2-12 intact [ x] Abnormal  [  ]  ROM: WFL all extremities [ x ]  Abnormal [  ]  Motor Strength: WFL all extremities  [ 4/5 bl  le   ]  Abnormal [  ]  Sensation: intact to light touch [ x ] Abnormal [  ]  Reflexes: Symmetric [ x ]  Abnormal [  ]    FUNCTIONAL STATUS:  Bed Mobility: Independent [  ]  Supervision [  ]  Needs Assistance [x  ]  N/A [  ]  Transfers: Independent [  ]  Supervision [  ]  Needs Assistance [ x ]  N/A [  ]   Ambulation: Independent [  ]  Supervision [  ]  Needs Assistance [ x ]  N/A [  ]  ADL: Independent [  ] Requires Assistance [  ] N/A [x  ]  SEE PT/OT IE NOTES    LABS:                        8.8    9.67  )-----------( 189      ( 02 Nov 2021 07:24 )             26.6     11-02    137  |  106  |  15  ----------------------------<  125<H>  4.0   |  24  |  0.7    Ca    8.2<L>      02 Nov 2021 07:24  Mg     1.9     11-01    TPro  5.1<L>  /  Alb  3.4<L>  /  TBili  0.6  /  DBili  x   /  AST  16  /  ALT  10  /  AlkPhos  46  11-01          RADIOLOGY & ADDITIONAL STUDIES:< from: Xray Hip 2-3 Views, Left (10.29.21 @ 21:23) >  Acute, displaced left femoral neck fracture. Osteopenia. Degenerative change of bilateral hips and lower lumbar spine.    IMPRESSION:  Acute, displaced left femoral neck fracture.    < end of copied text >      Assesment:

## 2021-11-03 LAB
ALBUMIN SERPL ELPH-MCNC: 2.8 G/DL — LOW (ref 3.5–5.2)
ALP SERPL-CCNC: 40 U/L — SIGNIFICANT CHANGE UP (ref 30–115)
ALT FLD-CCNC: 11 U/L — SIGNIFICANT CHANGE UP (ref 0–41)
ANION GAP SERPL CALC-SCNC: 14 MMOL/L — SIGNIFICANT CHANGE UP (ref 7–14)
AST SERPL-CCNC: 51 U/L — HIGH (ref 0–41)
BASOPHILS # BLD AUTO: 0.01 K/UL — SIGNIFICANT CHANGE UP (ref 0–0.2)
BASOPHILS # BLD AUTO: 0.01 K/UL — SIGNIFICANT CHANGE UP (ref 0–0.2)
BASOPHILS NFR BLD AUTO: 0.1 % — SIGNIFICANT CHANGE UP (ref 0–1)
BASOPHILS NFR BLD AUTO: 0.1 % — SIGNIFICANT CHANGE UP (ref 0–1)
BILIRUB SERPL-MCNC: 0.6 MG/DL — SIGNIFICANT CHANGE UP (ref 0.2–1.2)
BUN SERPL-MCNC: 19 MG/DL — SIGNIFICANT CHANGE UP (ref 10–20)
CALCIUM SERPL-MCNC: 8.2 MG/DL — LOW (ref 8.5–10.1)
CHLORIDE SERPL-SCNC: 107 MMOL/L — SIGNIFICANT CHANGE UP (ref 98–110)
CO2 SERPL-SCNC: 19 MMOL/L — SIGNIFICANT CHANGE UP (ref 17–32)
CREAT SERPL-MCNC: 0.7 MG/DL — SIGNIFICANT CHANGE UP (ref 0.7–1.5)
EOSINOPHIL # BLD AUTO: 0 K/UL — SIGNIFICANT CHANGE UP (ref 0–0.7)
EOSINOPHIL # BLD AUTO: 0 K/UL — SIGNIFICANT CHANGE UP (ref 0–0.7)
EOSINOPHIL NFR BLD AUTO: 0 % — SIGNIFICANT CHANGE UP (ref 0–8)
EOSINOPHIL NFR BLD AUTO: 0 % — SIGNIFICANT CHANGE UP (ref 0–8)
GLUCOSE SERPL-MCNC: 148 MG/DL — HIGH (ref 70–99)
HCT VFR BLD CALC: 25.2 % — LOW (ref 37–47)
HCT VFR BLD CALC: 28.3 % — LOW (ref 37–47)
HGB BLD-MCNC: 8.2 G/DL — LOW (ref 12–16)
HGB BLD-MCNC: 9.4 G/DL — LOW (ref 12–16)
IMM GRANULOCYTES NFR BLD AUTO: 0.6 % — HIGH (ref 0.1–0.3)
IMM GRANULOCYTES NFR BLD AUTO: 0.9 % — HIGH (ref 0.1–0.3)
LYMPHOCYTES # BLD AUTO: 1.03 K/UL — LOW (ref 1.2–3.4)
LYMPHOCYTES # BLD AUTO: 1.12 K/UL — LOW (ref 1.2–3.4)
LYMPHOCYTES # BLD AUTO: 10.2 % — LOW (ref 20.5–51.1)
LYMPHOCYTES # BLD AUTO: 12.1 % — LOW (ref 20.5–51.1)
MAGNESIUM SERPL-MCNC: 1.9 MG/DL — SIGNIFICANT CHANGE UP (ref 1.8–2.4)
MCHC RBC-ENTMCNC: 30.1 PG — SIGNIFICANT CHANGE UP (ref 27–31)
MCHC RBC-ENTMCNC: 30.1 PG — SIGNIFICANT CHANGE UP (ref 27–31)
MCHC RBC-ENTMCNC: 32.5 G/DL — SIGNIFICANT CHANGE UP (ref 32–37)
MCHC RBC-ENTMCNC: 33.2 G/DL — SIGNIFICANT CHANGE UP (ref 32–37)
MCV RBC AUTO: 90.7 FL — SIGNIFICANT CHANGE UP (ref 81–99)
MCV RBC AUTO: 92.6 FL — SIGNIFICANT CHANGE UP (ref 81–99)
MONOCYTES # BLD AUTO: 0.47 K/UL — SIGNIFICANT CHANGE UP (ref 0.1–0.6)
MONOCYTES # BLD AUTO: 0.52 K/UL — SIGNIFICANT CHANGE UP (ref 0.1–0.6)
MONOCYTES NFR BLD AUTO: 4.6 % — SIGNIFICANT CHANGE UP (ref 1.7–9.3)
MONOCYTES NFR BLD AUTO: 5.6 % — SIGNIFICANT CHANGE UP (ref 1.7–9.3)
NEUTROPHILS # BLD AUTO: 7.55 K/UL — HIGH (ref 1.4–6.5)
NEUTROPHILS # BLD AUTO: 8.57 K/UL — HIGH (ref 1.4–6.5)
NEUTROPHILS NFR BLD AUTO: 81.3 % — HIGH (ref 42.2–75.2)
NEUTROPHILS NFR BLD AUTO: 84.5 % — HIGH (ref 42.2–75.2)
NRBC # BLD: 0 /100 WBCS — SIGNIFICANT CHANGE UP (ref 0–0)
NRBC # BLD: 0 /100 WBCS — SIGNIFICANT CHANGE UP (ref 0–0)
PLATELET # BLD AUTO: 202 K/UL — SIGNIFICANT CHANGE UP (ref 130–400)
PLATELET # BLD AUTO: 223 K/UL — SIGNIFICANT CHANGE UP (ref 130–400)
POTASSIUM SERPL-MCNC: 3.6 MMOL/L — SIGNIFICANT CHANGE UP (ref 3.5–5)
POTASSIUM SERPL-SCNC: 3.6 MMOL/L — SIGNIFICANT CHANGE UP (ref 3.5–5)
PROT SERPL-MCNC: 4.6 G/DL — LOW (ref 6–8)
RBC # BLD: 2.72 M/UL — LOW (ref 4.2–5.4)
RBC # BLD: 3.12 M/UL — LOW (ref 4.2–5.4)
RBC # FLD: 13.7 % — SIGNIFICANT CHANGE UP (ref 11.5–14.5)
RBC # FLD: 13.9 % — SIGNIFICANT CHANGE UP (ref 11.5–14.5)
SODIUM SERPL-SCNC: 140 MMOL/L — SIGNIFICANT CHANGE UP (ref 135–146)
TROPONIN T SERPL-MCNC: 0.5 NG/ML — CRITICAL HIGH
WBC # BLD: 10.14 K/UL — SIGNIFICANT CHANGE UP (ref 4.8–10.8)
WBC # BLD: 9.28 K/UL — SIGNIFICANT CHANGE UP (ref 4.8–10.8)
WBC # FLD AUTO: 10.14 K/UL — SIGNIFICANT CHANGE UP (ref 4.8–10.8)
WBC # FLD AUTO: 9.28 K/UL — SIGNIFICANT CHANGE UP (ref 4.8–10.8)

## 2021-11-03 PROCEDURE — 99231 SBSQ HOSP IP/OBS SF/LOW 25: CPT

## 2021-11-03 PROCEDURE — 93010 ELECTROCARDIOGRAM REPORT: CPT | Mod: 76

## 2021-11-03 PROCEDURE — 99233 SBSQ HOSP IP/OBS HIGH 50: CPT

## 2021-11-03 RX ORDER — NITROGLYCERIN 6.5 MG
0.5 CAPSULE, EXTENDED RELEASE ORAL ONCE
Refills: 0 | Status: COMPLETED | OUTPATIENT
Start: 2021-11-03 | End: 2021-11-03

## 2021-11-03 RX ORDER — RANOLAZINE 500 MG/1
500 TABLET, FILM COATED, EXTENDED RELEASE ORAL
Refills: 0 | Status: DISCONTINUED | OUTPATIENT
Start: 2021-11-03 | End: 2021-11-08

## 2021-11-03 RX ORDER — ONDANSETRON 8 MG/1
4 TABLET, FILM COATED ORAL ONCE
Refills: 0 | Status: COMPLETED | OUTPATIENT
Start: 2021-11-03 | End: 2021-11-03

## 2021-11-03 RX ADMIN — ONDANSETRON 4 MILLIGRAM(S): 8 TABLET, FILM COATED ORAL at 02:33

## 2021-11-03 RX ADMIN — PANTOPRAZOLE SODIUM 40 MILLIGRAM(S): 20 TABLET, DELAYED RELEASE ORAL at 05:24

## 2021-11-03 RX ADMIN — CHLORHEXIDINE GLUCONATE 1 APPLICATION(S): 213 SOLUTION TOPICAL at 05:24

## 2021-11-03 RX ADMIN — Medication 81 MILLIGRAM(S): at 18:08

## 2021-11-03 RX ADMIN — Medication 0.4 MILLIGRAM(S): at 01:59

## 2021-11-03 RX ADMIN — Medication 0.5 INCH(S): at 14:31

## 2021-11-03 RX ADMIN — RANOLAZINE 500 MILLIGRAM(S): 500 TABLET, FILM COATED, EXTENDED RELEASE ORAL at 18:08

## 2021-11-03 RX ADMIN — ENOXAPARIN SODIUM 40 MILLIGRAM(S): 100 INJECTION SUBCUTANEOUS at 11:12

## 2021-11-03 RX ADMIN — Medication 81 MILLIGRAM(S): at 05:24

## 2021-11-03 RX ADMIN — Medication 0.5 INCH(S): at 02:33

## 2021-11-03 RX ADMIN — Medication 12.5 MILLIGRAM(S): at 05:24

## 2021-11-03 NOTE — PROGRESS NOTE ADULT - ASSESSMENT
89 Y/O F with a past medical history of HTN was admitted on 10/29 s/p fall with a L hip fracture, underwent a total hip replacement on 11/1 and later that evening postoperatively developed chest pain and ruled in for:    NSTEMI s/p L total hip replacement     - aspirin, Ranexa, metoprolol and Lisinopril   - add Lipitor   - s/p transfusion of 1 unit PRBC   - follow H/H   - possible cardiac cath prior to DC   - cardiology following

## 2021-11-03 NOTE — CHART NOTE - NSCHARTNOTEFT_GEN_A_CORE
pt c/o midsternal chest discomfort , 5/10 ecg done show lateral wall ischemia , troponins trending up , will give  nitro and morphine.  pt may benefit from reperfusion therapy pendin cardio decision.

## 2021-11-04 LAB
ALBUMIN SERPL ELPH-MCNC: 2.9 G/DL — LOW (ref 3.5–5.2)
ALP SERPL-CCNC: 43 U/L — SIGNIFICANT CHANGE UP (ref 30–115)
ALT FLD-CCNC: 10 U/L — SIGNIFICANT CHANGE UP (ref 0–41)
ANION GAP SERPL CALC-SCNC: 8 MMOL/L — SIGNIFICANT CHANGE UP (ref 7–14)
AST SERPL-CCNC: 48 U/L — HIGH (ref 0–41)
BILIRUB SERPL-MCNC: 1 MG/DL — SIGNIFICANT CHANGE UP (ref 0.2–1.2)
BUN SERPL-MCNC: 18 MG/DL — SIGNIFICANT CHANGE UP (ref 10–20)
CALCIUM SERPL-MCNC: 8.1 MG/DL — LOW (ref 8.5–10.1)
CHLORIDE SERPL-SCNC: 108 MMOL/L — SIGNIFICANT CHANGE UP (ref 98–110)
CO2 SERPL-SCNC: 25 MMOL/L — SIGNIFICANT CHANGE UP (ref 17–32)
CREAT SERPL-MCNC: 0.7 MG/DL — SIGNIFICANT CHANGE UP (ref 0.7–1.5)
GLUCOSE SERPL-MCNC: 114 MG/DL — HIGH (ref 70–99)
HCT VFR BLD CALC: 29.7 % — LOW (ref 37–47)
HGB BLD-MCNC: 9.8 G/DL — LOW (ref 12–16)
MAGNESIUM SERPL-MCNC: 2 MG/DL — SIGNIFICANT CHANGE UP (ref 1.8–2.4)
MCHC RBC-ENTMCNC: 30.4 PG — SIGNIFICANT CHANGE UP (ref 27–31)
MCHC RBC-ENTMCNC: 33 G/DL — SIGNIFICANT CHANGE UP (ref 32–37)
MCV RBC AUTO: 92.2 FL — SIGNIFICANT CHANGE UP (ref 81–99)
NRBC # BLD: 0 /100 WBCS — SIGNIFICANT CHANGE UP (ref 0–0)
PLATELET # BLD AUTO: 211 K/UL — SIGNIFICANT CHANGE UP (ref 130–400)
POTASSIUM SERPL-MCNC: 4.4 MMOL/L — SIGNIFICANT CHANGE UP (ref 3.5–5)
POTASSIUM SERPL-SCNC: 4.4 MMOL/L — SIGNIFICANT CHANGE UP (ref 3.5–5)
PROT SERPL-MCNC: 4.5 G/DL — LOW (ref 6–8)
RBC # BLD: 3.22 M/UL — LOW (ref 4.2–5.4)
RBC # FLD: 14.2 % — SIGNIFICANT CHANGE UP (ref 11.5–14.5)
SODIUM SERPL-SCNC: 141 MMOL/L — SIGNIFICANT CHANGE UP (ref 135–146)
WBC # BLD: 8.17 K/UL — SIGNIFICANT CHANGE UP (ref 4.8–10.8)
WBC # FLD AUTO: 8.17 K/UL — SIGNIFICANT CHANGE UP (ref 4.8–10.8)

## 2021-11-04 PROCEDURE — 99232 SBSQ HOSP IP/OBS MODERATE 35: CPT

## 2021-11-04 PROCEDURE — 99233 SBSQ HOSP IP/OBS HIGH 50: CPT

## 2021-11-04 PROCEDURE — 93010 ELECTROCARDIOGRAM REPORT: CPT

## 2021-11-04 RX ORDER — ATORVASTATIN CALCIUM 80 MG/1
20 TABLET, FILM COATED ORAL AT BEDTIME
Refills: 0 | Status: DISCONTINUED | OUTPATIENT
Start: 2021-11-04 | End: 2021-11-08

## 2021-11-04 RX ORDER — ENOXAPARIN SODIUM 100 MG/ML
40 INJECTION SUBCUTANEOUS DAILY
Refills: 0 | Status: DISCONTINUED | OUTPATIENT
Start: 2021-11-04 | End: 2021-11-08

## 2021-11-04 RX ADMIN — Medication 81 MILLIGRAM(S): at 17:40

## 2021-11-04 RX ADMIN — RANOLAZINE 500 MILLIGRAM(S): 500 TABLET, FILM COATED, EXTENDED RELEASE ORAL at 06:03

## 2021-11-04 RX ADMIN — SENNA PLUS 2 TABLET(S): 8.6 TABLET ORAL at 21:20

## 2021-11-04 RX ADMIN — PANTOPRAZOLE SODIUM 40 MILLIGRAM(S): 20 TABLET, DELAYED RELEASE ORAL at 06:03

## 2021-11-04 RX ADMIN — CELECOXIB 200 MILLIGRAM(S): 200 CAPSULE ORAL at 12:31

## 2021-11-04 RX ADMIN — Medication 12.5 MILLIGRAM(S): at 06:03

## 2021-11-04 RX ADMIN — CELECOXIB 200 MILLIGRAM(S): 200 CAPSULE ORAL at 11:43

## 2021-11-04 RX ADMIN — CHLORHEXIDINE GLUCONATE 1 APPLICATION(S): 213 SOLUTION TOPICAL at 06:03

## 2021-11-04 RX ADMIN — Medication 12.5 MILLIGRAM(S): at 20:33

## 2021-11-04 RX ADMIN — RANOLAZINE 500 MILLIGRAM(S): 500 TABLET, FILM COATED, EXTENDED RELEASE ORAL at 17:40

## 2021-11-04 RX ADMIN — ENOXAPARIN SODIUM 40 MILLIGRAM(S): 100 INJECTION SUBCUTANEOUS at 12:31

## 2021-11-04 RX ADMIN — Medication 81 MILLIGRAM(S): at 06:04

## 2021-11-04 NOTE — PROGRESS NOTE ADULT - ASSESSMENT
Patient feels much better after one unit blood. No further chest pain. cbc improved after one unit blood. No evidence bleeding. Would ambulate . Check ekg today.

## 2021-11-04 NOTE — PHARMACOTHERAPY INTERVENTION NOTE - COMMENTS
On lovenox 40mg sq bid for dvt ppx s/p orthopedic surgery?   Rec to consider going down to 30mg sq bid for s/p orthopedic indication or 40mg daily

## 2021-11-04 NOTE — PROGRESS NOTE ADULT - ASSESSMENT
89 Y/O F with a past medical history of HTN was admitted on 10/29 s/p fall with a L hip fracture, underwent a total hip replacement on 11/1 and later that evening postoperatively developed chest pain and ruled in for:    NSTEMI s/p L total hip replacement     - aspirin, Ranexa, metoprolol and Lisinopril,  Lipitor   - s/p transfusion of 1 unit PRBC   - H/H 10.8->8.8->9.8   - possible cardiac cath prior to DC   - cardiology following

## 2021-11-05 ENCOUNTER — TRANSCRIPTION ENCOUNTER (OUTPATIENT)
Age: 86
End: 2021-11-05

## 2021-11-05 LAB
ALBUMIN SERPL ELPH-MCNC: 2.7 G/DL — LOW (ref 3.5–5.2)
ALP SERPL-CCNC: 41 U/L — SIGNIFICANT CHANGE UP (ref 30–115)
ALT FLD-CCNC: 12 U/L — SIGNIFICANT CHANGE UP (ref 0–41)
ANION GAP SERPL CALC-SCNC: 8 MMOL/L — SIGNIFICANT CHANGE UP (ref 7–14)
AST SERPL-CCNC: 33 U/L — SIGNIFICANT CHANGE UP (ref 0–41)
BASOPHILS # BLD AUTO: 0.02 K/UL — SIGNIFICANT CHANGE UP (ref 0–0.2)
BASOPHILS NFR BLD AUTO: 0.3 % — SIGNIFICANT CHANGE UP (ref 0–1)
BILIRUB SERPL-MCNC: 0.7 MG/DL — SIGNIFICANT CHANGE UP (ref 0.2–1.2)
BLD GP AB SCN SERPL QL: SIGNIFICANT CHANGE UP
BUN SERPL-MCNC: 24 MG/DL — HIGH (ref 10–20)
CALCIUM SERPL-MCNC: 7.8 MG/DL — LOW (ref 8.5–10.1)
CHLORIDE SERPL-SCNC: 107 MMOL/L — SIGNIFICANT CHANGE UP (ref 98–110)
CO2 SERPL-SCNC: 24 MMOL/L — SIGNIFICANT CHANGE UP (ref 17–32)
CREAT SERPL-MCNC: 0.7 MG/DL — SIGNIFICANT CHANGE UP (ref 0.7–1.5)
EOSINOPHIL # BLD AUTO: 0.15 K/UL — SIGNIFICANT CHANGE UP (ref 0–0.7)
EOSINOPHIL NFR BLD AUTO: 2.2 % — SIGNIFICANT CHANGE UP (ref 0–8)
GLUCOSE SERPL-MCNC: 112 MG/DL — HIGH (ref 70–99)
HCT VFR BLD CALC: 26.8 % — LOW (ref 37–47)
HGB BLD-MCNC: 8.9 G/DL — LOW (ref 12–16)
IMM GRANULOCYTES NFR BLD AUTO: 0.7 % — HIGH (ref 0.1–0.3)
LYMPHOCYTES # BLD AUTO: 1.27 K/UL — SIGNIFICANT CHANGE UP (ref 1.2–3.4)
LYMPHOCYTES # BLD AUTO: 18.5 % — LOW (ref 20.5–51.1)
MAGNESIUM SERPL-MCNC: 1.9 MG/DL — SIGNIFICANT CHANGE UP (ref 1.8–2.4)
MCHC RBC-ENTMCNC: 30.4 PG — SIGNIFICANT CHANGE UP (ref 27–31)
MCHC RBC-ENTMCNC: 33.2 G/DL — SIGNIFICANT CHANGE UP (ref 32–37)
MCV RBC AUTO: 91.5 FL — SIGNIFICANT CHANGE UP (ref 81–99)
MONOCYTES # BLD AUTO: 0.33 K/UL — SIGNIFICANT CHANGE UP (ref 0.1–0.6)
MONOCYTES NFR BLD AUTO: 4.8 % — SIGNIFICANT CHANGE UP (ref 1.7–9.3)
NEUTROPHILS # BLD AUTO: 5.05 K/UL — SIGNIFICANT CHANGE UP (ref 1.4–6.5)
NEUTROPHILS NFR BLD AUTO: 73.5 % — SIGNIFICANT CHANGE UP (ref 42.2–75.2)
NRBC # BLD: 0 /100 WBCS — SIGNIFICANT CHANGE UP (ref 0–0)
PHOSPHATE SERPL-MCNC: 2.1 MG/DL — SIGNIFICANT CHANGE UP (ref 2.1–4.9)
PLATELET # BLD AUTO: 215 K/UL — SIGNIFICANT CHANGE UP (ref 130–400)
POTASSIUM SERPL-MCNC: 3.7 MMOL/L — SIGNIFICANT CHANGE UP (ref 3.5–5)
POTASSIUM SERPL-SCNC: 3.7 MMOL/L — SIGNIFICANT CHANGE UP (ref 3.5–5)
PROT SERPL-MCNC: 4.1 G/DL — LOW (ref 6–8)
RBC # BLD: 2.93 M/UL — LOW (ref 4.2–5.4)
RBC # FLD: 13.9 % — SIGNIFICANT CHANGE UP (ref 11.5–14.5)
SARS-COV-2 RNA SPEC QL NAA+PROBE: SIGNIFICANT CHANGE UP
SODIUM SERPL-SCNC: 139 MMOL/L — SIGNIFICANT CHANGE UP (ref 135–146)
WBC # BLD: 6.87 K/UL — SIGNIFICANT CHANGE UP (ref 4.8–10.8)
WBC # FLD AUTO: 6.87 K/UL — SIGNIFICANT CHANGE UP (ref 4.8–10.8)

## 2021-11-05 PROCEDURE — 99233 SBSQ HOSP IP/OBS HIGH 50: CPT

## 2021-11-05 PROCEDURE — 93010 ELECTROCARDIOGRAM REPORT: CPT

## 2021-11-05 PROCEDURE — 99232 SBSQ HOSP IP/OBS MODERATE 35: CPT

## 2021-11-05 RX ORDER — ATORVASTATIN CALCIUM 80 MG/1
1 TABLET, FILM COATED ORAL
Qty: 0 | Refills: 0 | DISCHARGE
Start: 2021-11-05

## 2021-11-05 RX ORDER — METOPROLOL TARTRATE 50 MG
0.5 TABLET ORAL
Qty: 30 | Refills: 0
Start: 2021-11-05 | End: 2021-12-04

## 2021-11-05 RX ORDER — RANOLAZINE 500 MG/1
1 TABLET, FILM COATED, EXTENDED RELEASE ORAL
Qty: 0 | Refills: 0 | DISCHARGE
Start: 2021-11-05

## 2021-11-05 RX ORDER — ASPIRIN/CALCIUM CARB/MAGNESIUM 324 MG
1 TABLET ORAL
Qty: 0 | Refills: 0 | DISCHARGE
Start: 2021-11-05

## 2021-11-05 RX ADMIN — CHLORHEXIDINE GLUCONATE 1 APPLICATION(S): 213 SOLUTION TOPICAL at 05:54

## 2021-11-05 RX ADMIN — LISINOPRIL 5 MILLIGRAM(S): 2.5 TABLET ORAL at 12:10

## 2021-11-05 RX ADMIN — CELECOXIB 200 MILLIGRAM(S): 200 CAPSULE ORAL at 17:12

## 2021-11-05 RX ADMIN — Medication 12.5 MILLIGRAM(S): at 17:13

## 2021-11-05 RX ADMIN — Medication 81 MILLIGRAM(S): at 05:52

## 2021-11-05 RX ADMIN — Medication 12.5 MILLIGRAM(S): at 05:52

## 2021-11-05 RX ADMIN — RANOLAZINE 500 MILLIGRAM(S): 500 TABLET, FILM COATED, EXTENDED RELEASE ORAL at 17:13

## 2021-11-05 RX ADMIN — Medication 81 MILLIGRAM(S): at 17:13

## 2021-11-05 RX ADMIN — SENNA PLUS 2 TABLET(S): 8.6 TABLET ORAL at 22:18

## 2021-11-05 RX ADMIN — ENOXAPARIN SODIUM 40 MILLIGRAM(S): 100 INJECTION SUBCUTANEOUS at 16:50

## 2021-11-05 RX ADMIN — PANTOPRAZOLE SODIUM 40 MILLIGRAM(S): 20 TABLET, DELAYED RELEASE ORAL at 05:52

## 2021-11-05 RX ADMIN — CELECOXIB 200 MILLIGRAM(S): 200 CAPSULE ORAL at 12:06

## 2021-11-05 RX ADMIN — RANOLAZINE 500 MILLIGRAM(S): 500 TABLET, FILM COATED, EXTENDED RELEASE ORAL at 05:51

## 2021-11-05 NOTE — PROGRESS NOTE ADULT - ASSESSMENT
. No further chest pain. Since she received blood. No evidence bleeding. Would ambulate . Watch cbc. Transfuse if needed

## 2021-11-05 NOTE — DISCHARGE NOTE PROVIDER - PROVIDER TOKENS
PROVIDER:[TOKEN:[[id number]],FOLLOWUP:[1 week]],PROVIDER:[TOKEN:[[id number]],FOLLOWUP:[1 week]]

## 2021-11-05 NOTE — DISCHARGE NOTE PROVIDER - NSDCCPCAREPLAN_GEN_ALL_CORE_FT
PRINCIPAL DISCHARGE DIAGNOSIS  Diagnosis: Hip fracture  Assessment and Plan of Treatment: You had a left femoral neck fracture.  You underwent a surgrical repair on 11/1/21.  You must take care of your hip as you recover.  This means moving and sitting the way you were taught [facility].  Please continue taking your aspirin twice a day. Get up carefully and move around to ease pain.  Please follow up with your orthopedic surgeon.      SECONDARY DISCHARGE DIAGNOSES  Diagnosis: Non-ST elevation MI (NSTEMI)  Assessment and Plan of Treatment: A heart attack occurs when blood flow to part of your heart is blocked long enough that part of heart muscle is damaged. This may have been due to blood loss due to your hip surgery.  You were given 1 unit of blood which relieved the pain. Your ECG was normal after the transfusion and echocardiogram showed that the heart was pumping normally.  You will be discharged on metoprolol and lisinopril. Please follow up with your cardiologist.

## 2021-11-05 NOTE — DISCHARGE NOTE PROVIDER - CARE PROVIDER_API CALL
Ant Contreras)  Cardiovascular Disease; Internal Medicine  [address] [place] [address]  Phone: [phone]  Fax: [phone]  Follow Up Time: 1 week    Alirio Jane)  Orthopaedic Surgery  [address] [place] [address]  Phone: [phone]  Fax: [phone]  Follow Up Time: 1 week

## 2021-11-05 NOTE — CHART NOTE - NSCHARTNOTEFT_GEN_A_CORE
Registered Dietitian Follow-Up     Patient Profile Reviewed                           Yes [X   No []     Nutrition History Previously Obtained        Yes [X]  No []       Pertinent  Information:  pt is 89 year old female with hx of HTN presents s/p mechanical fall + fx of L femoral neck with varus angulation. s/p total hip replacement 11/1/21. upgraded to CCU on 11/3  2/2 chest pain with EKG changes + ischemia, elevated trops/ NSTEMI s/p 1u PRBC         Diet order: DASH/TLC with ensure enlive 240 ml q 12 hrs      Anthropometrics:  - Ht. 165.1 cm  - Wt. 51.1 kgs vs 46.7 kgs upon admission   - %wt change  - BMI   - IBW      Pertinent Lab Data: 11/5/21: hgb 8.9L, hct 26.8L, BUn 24H, gluc 112H        MEDICATIONS  (STANDING):  aspirin  chewable 81 milliGRAM(s) Oral two times a day  atorvastatin 20 milliGRAM(s) Oral at bedtime  celecoxib 200 milliGRAM(s) Oral daily  chlorhexidine 4% Liquid 1 Application(s) Topical <User Schedule>  enoxaparin Injectable 40 milliGRAM(s) SubCutaneous daily  lisinopril 5 milliGRAM(s) Oral daily  metoprolol tartrate 12.5 milliGRAM(s) Oral two times a day  pantoprazole    Tablet 40 milliGRAM(s) Oral before breakfast  ranolazine 500 milliGRAM(s) Oral two times a day  senna 2 Tablet(s) Oral at bedtime    MEDICATIONS  (PRN):  nitroglycerin     SubLingual 0.4 milliGRAM(s) SubLingual every 5 minutes PRN Chest Pain  ondansetron Injectable 4 milliGRAM(s) IV Push every 6 hours PRN Nausea and/or Vomiting  traMADol 50 milliGRAM(s) Oral every 6 hours PRN Severe Pain (7 - 10)       Physical Findings:  - Appearance: alert, orientated   - GI function: +BS   - Tubes: n/a   - Oral/Mouth cavity:  - Skin: intact 46.7 kgs      Nutrition Requirements  Weight Used: 46.7 kgs      Estimated nutrient Needs :    894*1.4=1027 kcals, 46.7-56g pro (1.0-1.2g/kg/BW) 1;1 kcal for estimated fluid needs       Nutrient Intake: Po intake has greatly improved pt now consumes >50% of meals and >75% of ensure enlive.        [] Previous Nutrition Diagnosis:            [] Ongoing          [X] Resolved    [X] No active nutrition diagnosis identified at this time        Nutrition Intervention: maintain on present diet with ensure enlive 2x daily     Goal/Expected Outcome: pt to continue to tolerate po diet and meet at least 75% of estimated nutrient needs      Indicator/Monitoring: RD to continue to to monitor tolerance to po diet, labs/meds, NFPF and f/u as needed within 4-6 days

## 2021-11-05 NOTE — DISCHARGE NOTE PROVIDER - HOSPITAL COURSE
HPI: 89y F with PMH of HTN presents with CC of fall. Patient reports that she was wearing old slippers, and slipped on her right leg and landed on her left side. Complaining of left hip pain. No head trauma, no LOC, denies prodrome of CP, palpitations, dizziness, unsteady gait. no abdominal pain, No N/V/D. Admitted for ORIF w/ortho Monday.     ED COURSE: found to have Acute, minimally displaced subcapital fracture of the left femoral neck with varus angulation.     Patient is s/p Left Hip ORIF on 11/1/21. Patient developed chest pain and ST depressions diffusely on 11/2/21 and Troponin uptrended from .23 to .50.  Hgb downtrended to 8.2.  Patient was transfused with 1U PRBC on 11/3/21 with appropriate Hgb response to 9.4. Decision was made to not undergo cath as patient's ECG changes have resolved. Patient stable for discharge today.

## 2021-11-05 NOTE — DISCHARGE NOTE PROVIDER - NSDCMRMEDTOKEN_GEN_ALL_CORE_FT
aspirin 81 mg oral tablet, chewable: 1 tab(s) orally 2 times a day  atorvastatin 20 mg oral tablet: 1 tab(s) orally once a day (at bedtime)  lisinopril 5 mg oral tablet: 1 tab(s) orally once a day  ranolazine 500 mg oral tablet, extended release: 1 tab(s) orally 2 times a day

## 2021-11-05 NOTE — DISCHARGE NOTE PROVIDER - CARE PROVIDERS DIRECT ADDRESSES
[email or web address]

## 2021-11-05 NOTE — PROGRESS NOTE ADULT - ASSESSMENT
89 Y/O F with a past medical history of HTN was admitted on 10/29 s/p fall with a L hip fracture, underwent a total hip replacement on 11/1 and later that evening postoperatively developed chest pain and ruled in for:    NSTEMI s/p L total hip replacement     - aspirin, Ranexa, metoprolol, Lisinopril and Lipitor   - s/p transfusion of 1 unit PRBC   - H/H 10.8->8.8->9.8->8.9   - monitor H/H    - possible cardiac cath prior to DC   - cardiology following

## 2021-11-06 LAB
ALBUMIN SERPL ELPH-MCNC: 2.7 G/DL — LOW (ref 3.5–5.2)
ALP SERPL-CCNC: 43 U/L — SIGNIFICANT CHANGE UP (ref 30–115)
ALT FLD-CCNC: 12 U/L — SIGNIFICANT CHANGE UP (ref 0–41)
ANION GAP SERPL CALC-SCNC: 10 MMOL/L — SIGNIFICANT CHANGE UP (ref 7–14)
AST SERPL-CCNC: 25 U/L — SIGNIFICANT CHANGE UP (ref 0–41)
BASOPHILS # BLD AUTO: 0.02 K/UL — SIGNIFICANT CHANGE UP (ref 0–0.2)
BASOPHILS NFR BLD AUTO: 0.3 % — SIGNIFICANT CHANGE UP (ref 0–1)
BILIRUB SERPL-MCNC: 0.9 MG/DL — SIGNIFICANT CHANGE UP (ref 0.2–1.2)
BUN SERPL-MCNC: 22 MG/DL — HIGH (ref 10–20)
CALCIUM SERPL-MCNC: 7.8 MG/DL — LOW (ref 8.5–10.1)
CHLORIDE SERPL-SCNC: 107 MMOL/L — SIGNIFICANT CHANGE UP (ref 98–110)
CK MB CFR SERPL CALC: 2.4 NG/ML — SIGNIFICANT CHANGE UP (ref 0.6–6.3)
CK SERPL-CCNC: 177 U/L — SIGNIFICANT CHANGE UP (ref 0–225)
CO2 SERPL-SCNC: 23 MMOL/L — SIGNIFICANT CHANGE UP (ref 17–32)
CREAT SERPL-MCNC: 0.6 MG/DL — LOW (ref 0.7–1.5)
EOSINOPHIL # BLD AUTO: 0.15 K/UL — SIGNIFICANT CHANGE UP (ref 0–0.7)
EOSINOPHIL NFR BLD AUTO: 2.3 % — SIGNIFICANT CHANGE UP (ref 0–8)
GLUCOSE SERPL-MCNC: 108 MG/DL — HIGH (ref 70–99)
HCT VFR BLD CALC: 27.8 % — LOW (ref 37–47)
HGB BLD-MCNC: 9.3 G/DL — LOW (ref 12–16)
IMM GRANULOCYTES NFR BLD AUTO: 0.8 % — HIGH (ref 0.1–0.3)
LYMPHOCYTES # BLD AUTO: 0.98 K/UL — LOW (ref 1.2–3.4)
LYMPHOCYTES # BLD AUTO: 15.3 % — LOW (ref 20.5–51.1)
MAGNESIUM SERPL-MCNC: 1.9 MG/DL — SIGNIFICANT CHANGE UP (ref 1.8–2.4)
MCHC RBC-ENTMCNC: 31.1 PG — HIGH (ref 27–31)
MCHC RBC-ENTMCNC: 33.5 G/DL — SIGNIFICANT CHANGE UP (ref 32–37)
MCV RBC AUTO: 93 FL — SIGNIFICANT CHANGE UP (ref 81–99)
MONOCYTES # BLD AUTO: 0.34 K/UL — SIGNIFICANT CHANGE UP (ref 0.1–0.6)
MONOCYTES NFR BLD AUTO: 5.3 % — SIGNIFICANT CHANGE UP (ref 1.7–9.3)
NEUTROPHILS # BLD AUTO: 4.87 K/UL — SIGNIFICANT CHANGE UP (ref 1.4–6.5)
NEUTROPHILS NFR BLD AUTO: 76 % — HIGH (ref 42.2–75.2)
NRBC # BLD: 0 /100 WBCS — SIGNIFICANT CHANGE UP (ref 0–0)
PHOSPHATE SERPL-MCNC: 2.6 MG/DL — SIGNIFICANT CHANGE UP (ref 2.1–4.9)
PLATELET # BLD AUTO: 277 K/UL — SIGNIFICANT CHANGE UP (ref 130–400)
POTASSIUM SERPL-MCNC: 3.9 MMOL/L — SIGNIFICANT CHANGE UP (ref 3.5–5)
POTASSIUM SERPL-SCNC: 3.9 MMOL/L — SIGNIFICANT CHANGE UP (ref 3.5–5)
PROT SERPL-MCNC: 4.3 G/DL — LOW (ref 6–8)
RBC # BLD: 2.99 M/UL — LOW (ref 4.2–5.4)
RBC # FLD: 14.2 % — SIGNIFICANT CHANGE UP (ref 11.5–14.5)
SODIUM SERPL-SCNC: 140 MMOL/L — SIGNIFICANT CHANGE UP (ref 135–146)
TROPONIN T SERPL-MCNC: 1.88 NG/ML — CRITICAL HIGH
WBC # BLD: 6.41 K/UL — SIGNIFICANT CHANGE UP (ref 4.8–10.8)
WBC # FLD AUTO: 6.41 K/UL — SIGNIFICANT CHANGE UP (ref 4.8–10.8)

## 2021-11-06 PROCEDURE — 99232 SBSQ HOSP IP/OBS MODERATE 35: CPT

## 2021-11-06 PROCEDURE — 93010 ELECTROCARDIOGRAM REPORT: CPT

## 2021-11-06 PROCEDURE — 99233 SBSQ HOSP IP/OBS HIGH 50: CPT

## 2021-11-06 RX ADMIN — LISINOPRIL 5 MILLIGRAM(S): 2.5 TABLET ORAL at 11:24

## 2021-11-06 RX ADMIN — CELECOXIB 200 MILLIGRAM(S): 200 CAPSULE ORAL at 12:24

## 2021-11-06 RX ADMIN — CHLORHEXIDINE GLUCONATE 1 APPLICATION(S): 213 SOLUTION TOPICAL at 05:04

## 2021-11-06 RX ADMIN — CELECOXIB 200 MILLIGRAM(S): 200 CAPSULE ORAL at 11:24

## 2021-11-06 RX ADMIN — Medication 12.5 MILLIGRAM(S): at 17:14

## 2021-11-06 RX ADMIN — RANOLAZINE 500 MILLIGRAM(S): 500 TABLET, FILM COATED, EXTENDED RELEASE ORAL at 05:04

## 2021-11-06 RX ADMIN — RANOLAZINE 500 MILLIGRAM(S): 500 TABLET, FILM COATED, EXTENDED RELEASE ORAL at 17:14

## 2021-11-06 RX ADMIN — Medication 81 MILLIGRAM(S): at 17:14

## 2021-11-06 RX ADMIN — ENOXAPARIN SODIUM 40 MILLIGRAM(S): 100 INJECTION SUBCUTANEOUS at 11:25

## 2021-11-06 RX ADMIN — Medication 81 MILLIGRAM(S): at 05:04

## 2021-11-06 RX ADMIN — Medication 12.5 MILLIGRAM(S): at 05:04

## 2021-11-06 RX ADMIN — PANTOPRAZOLE SODIUM 40 MILLIGRAM(S): 20 TABLET, DELAYED RELEASE ORAL at 06:15

## 2021-11-06 NOTE — PROGRESS NOTE ADULT - ASSESSMENT
. No further chest pain. Since she received blood. No evidence bleeding. Would ambulate . Watch cbc. Transfuse if needed. CBC pend today She will need rehab

## 2021-11-06 NOTE — CONSULT NOTE ADULT - SUBJECTIVE AND OBJECTIVE BOX
89 Y/O F with a past medical history of HTN was admitted on 10/29 s/p fall with a L hip fracture, underwent a total hip replacement on 11/1 and later that evening postoperatively developed chest pain.          REVIEW OF SYSTEMS:  No new complaints      MEDICATIONS  (STANDING):  aspirin  chewable 81 milliGRAM(s) Oral two times a day  atorvastatin 20 milliGRAM(s) Oral at bedtime  celecoxib 200 milliGRAM(s) Oral daily  chlorhexidine 4% Liquid 1 Application(s) Topical <User Schedule>  enoxaparin Injectable 40 milliGRAM(s) SubCutaneous daily  lisinopril 5 milliGRAM(s) Oral daily  metoprolol tartrate 12.5 milliGRAM(s) Oral two times a day  pantoprazole    Tablet 40 milliGRAM(s) Oral before breakfast  ranolazine 500 milliGRAM(s) Oral two times a day  senna 2 Tablet(s) Oral at bedtime    MEDICATIONS  (PRN):  nitroglycerin     SubLingual 0.4 milliGRAM(s) SubLingual every 5 minutes PRN Chest Pain  ondansetron Injectable 4 milliGRAM(s) IV Push every 6 hours PRN Nausea and/or Vomiting  traMADol 50 milliGRAM(s) Oral every 6 hours PRN Severe Pain (7 - 10)      Allergies  NKDA (Unknown)  Shrimp (Rash)          Vital Signs Last 24 Hrs  T(C): 36.7 (06 Nov 2021 07:10), Max: 36.7 (05 Nov 2021 23:05)  T(F): 98 (06 Nov 2021 07:10), Max: 98.1 (05 Nov 2021 23:05)  HR: 71 (06 Nov 2021 07:11) (71 - 84)  BP: 114/64 (06 Nov 2021 07:11) (94/55 - 129/63)  BP(mean): 84 (06 Nov 2021 07:11) (69 - 88)  RR: 20 (06 Nov 2021 07:10) (18 - 20)  SpO2: 97% (06 Nov 2021 07:11) (97% - 97%)    PHYSICAL EXAM:  GENERAL: NAD  HEAD:  Atraumatic, Normocephalic  EYES: EOMI, PERRLA, conjunctiva and sclera clear  NERVOUS SYSTEM:  Alert & Oriented X3, no focal deficits   CHEST/LUNG: Clear to percussion bilaterally; No rales, rhonchi, wheezing, or rubs  HEART: Regular rate and rhythm; No murmurs, rubs, or gallops  ABDOMEN: Soft, Nontender, Nondistended; Bowel sounds present  EXTREMITIES:  2+ Peripheral Pulses, No clubbing, cyanosis, or edema      LABS:                        9.3    6.41  )-----------( 277      ( 06 Nov 2021 06:45 )             27.8     11-06    140  |  107  |  22<H>  ----------------------------<  108<H>  3.9   |  23  |  0.6<L>    Ca    7.8<L>      06 Nov 2021 06:45  Phos  2.6     11-06  Mg     1.9     11-06    TPro  4.3<L>  /  Alb  2.7<L>  /  TBili  0.9  /  DBili  x   /  AST  25  /  ALT  12  /  AlkPhos  43  11-06

## 2021-11-06 NOTE — CONSULT NOTE ADULT - ASSESSMENT
89 Y/O F with a past medical history of HTN was admitted on 10/29 s/p fall with a L hip fracture, underwent a total hip replacement on 11/1 and later that evening postoperatively developed chest pain and ruled in for:    NSTEMI s/p L total hip replacement     - aspirin, Ranexa, metoprolol, Lisinopril and Lipitor   - monitor H/H    - possible cardiac cath prior to DC   - cardiology following  -Mgmt as per CCU team

## 2021-11-07 LAB
ALBUMIN SERPL ELPH-MCNC: 2.9 G/DL — LOW (ref 3.5–5.2)
ALP SERPL-CCNC: 46 U/L — SIGNIFICANT CHANGE UP (ref 30–115)
ALT FLD-CCNC: 11 U/L — SIGNIFICANT CHANGE UP (ref 0–41)
ANION GAP SERPL CALC-SCNC: 9 MMOL/L — SIGNIFICANT CHANGE UP (ref 7–14)
AST SERPL-CCNC: 20 U/L — SIGNIFICANT CHANGE UP (ref 0–41)
BASOPHILS # BLD AUTO: 0.01 K/UL — SIGNIFICANT CHANGE UP (ref 0–0.2)
BASOPHILS NFR BLD AUTO: 0.2 % — SIGNIFICANT CHANGE UP (ref 0–1)
BILIRUB SERPL-MCNC: 0.9 MG/DL — SIGNIFICANT CHANGE UP (ref 0.2–1.2)
BUN SERPL-MCNC: 19 MG/DL — SIGNIFICANT CHANGE UP (ref 10–20)
CALCIUM SERPL-MCNC: 8 MG/DL — LOW (ref 8.5–10.1)
CHLORIDE SERPL-SCNC: 105 MMOL/L — SIGNIFICANT CHANGE UP (ref 98–110)
CO2 SERPL-SCNC: 25 MMOL/L — SIGNIFICANT CHANGE UP (ref 17–32)
CREAT SERPL-MCNC: 0.6 MG/DL — LOW (ref 0.7–1.5)
EOSINOPHIL # BLD AUTO: 0.17 K/UL — SIGNIFICANT CHANGE UP (ref 0–0.7)
EOSINOPHIL NFR BLD AUTO: 3 % — SIGNIFICANT CHANGE UP (ref 0–8)
GLUCOSE SERPL-MCNC: 112 MG/DL — HIGH (ref 70–99)
HCT VFR BLD CALC: 29.6 % — LOW (ref 37–47)
HGB BLD-MCNC: 9.8 G/DL — LOW (ref 12–16)
IMM GRANULOCYTES NFR BLD AUTO: 1.2 % — HIGH (ref 0.1–0.3)
LYMPHOCYTES # BLD AUTO: 0.77 K/UL — LOW (ref 1.2–3.4)
LYMPHOCYTES # BLD AUTO: 13.7 % — LOW (ref 20.5–51.1)
MAGNESIUM SERPL-MCNC: 1.9 MG/DL — SIGNIFICANT CHANGE UP (ref 1.8–2.4)
MCHC RBC-ENTMCNC: 31.1 PG — HIGH (ref 27–31)
MCHC RBC-ENTMCNC: 33.1 G/DL — SIGNIFICANT CHANGE UP (ref 32–37)
MCV RBC AUTO: 94 FL — SIGNIFICANT CHANGE UP (ref 81–99)
MONOCYTES # BLD AUTO: 0.33 K/UL — SIGNIFICANT CHANGE UP (ref 0.1–0.6)
MONOCYTES NFR BLD AUTO: 5.9 % — SIGNIFICANT CHANGE UP (ref 1.7–9.3)
NEUTROPHILS # BLD AUTO: 4.26 K/UL — SIGNIFICANT CHANGE UP (ref 1.4–6.5)
NEUTROPHILS NFR BLD AUTO: 76 % — HIGH (ref 42.2–75.2)
NRBC # BLD: 0 /100 WBCS — SIGNIFICANT CHANGE UP (ref 0–0)
PHOSPHATE SERPL-MCNC: 2.6 MG/DL — SIGNIFICANT CHANGE UP (ref 2.1–4.9)
PLATELET # BLD AUTO: 315 K/UL — SIGNIFICANT CHANGE UP (ref 130–400)
POTASSIUM SERPL-MCNC: 3.7 MMOL/L — SIGNIFICANT CHANGE UP (ref 3.5–5)
POTASSIUM SERPL-SCNC: 3.7 MMOL/L — SIGNIFICANT CHANGE UP (ref 3.5–5)
PROT SERPL-MCNC: 4.4 G/DL — LOW (ref 6–8)
RBC # BLD: 3.15 M/UL — LOW (ref 4.2–5.4)
RBC # FLD: 14.6 % — HIGH (ref 11.5–14.5)
SODIUM SERPL-SCNC: 139 MMOL/L — SIGNIFICANT CHANGE UP (ref 135–146)
WBC # BLD: 5.61 K/UL — SIGNIFICANT CHANGE UP (ref 4.8–10.8)
WBC # FLD AUTO: 5.61 K/UL — SIGNIFICANT CHANGE UP (ref 4.8–10.8)

## 2021-11-07 PROCEDURE — 93010 ELECTROCARDIOGRAM REPORT: CPT

## 2021-11-07 PROCEDURE — 99233 SBSQ HOSP IP/OBS HIGH 50: CPT

## 2021-11-07 PROCEDURE — 99232 SBSQ HOSP IP/OBS MODERATE 35: CPT

## 2021-11-07 RX ADMIN — CELECOXIB 200 MILLIGRAM(S): 200 CAPSULE ORAL at 11:24

## 2021-11-07 RX ADMIN — RANOLAZINE 500 MILLIGRAM(S): 500 TABLET, FILM COATED, EXTENDED RELEASE ORAL at 05:25

## 2021-11-07 RX ADMIN — Medication 12.5 MILLIGRAM(S): at 17:01

## 2021-11-07 RX ADMIN — CELECOXIB 200 MILLIGRAM(S): 200 CAPSULE ORAL at 12:24

## 2021-11-07 RX ADMIN — PANTOPRAZOLE SODIUM 40 MILLIGRAM(S): 20 TABLET, DELAYED RELEASE ORAL at 06:31

## 2021-11-07 RX ADMIN — Medication 81 MILLIGRAM(S): at 05:25

## 2021-11-07 RX ADMIN — Medication 81 MILLIGRAM(S): at 17:01

## 2021-11-07 RX ADMIN — ENOXAPARIN SODIUM 40 MILLIGRAM(S): 100 INJECTION SUBCUTANEOUS at 11:24

## 2021-11-07 RX ADMIN — RANOLAZINE 500 MILLIGRAM(S): 500 TABLET, FILM COATED, EXTENDED RELEASE ORAL at 17:01

## 2021-11-07 RX ADMIN — Medication 12.5 MILLIGRAM(S): at 05:25

## 2021-11-07 RX ADMIN — ATORVASTATIN CALCIUM 20 MILLIGRAM(S): 80 TABLET, FILM COATED ORAL at 21:30

## 2021-11-07 RX ADMIN — LISINOPRIL 5 MILLIGRAM(S): 2.5 TABLET ORAL at 10:16

## 2021-11-07 RX ADMIN — CHLORHEXIDINE GLUCONATE 1 APPLICATION(S): 213 SOLUTION TOPICAL at 05:25

## 2021-11-07 NOTE — PROGRESS NOTE ADULT - ASSESSMENT
. No further chest pain. Since she received blood. No evidence bleeding. Would ambulate . Watch cbc. Transfuse if needed. Patient will need ehab

## 2021-11-07 NOTE — PROGRESS NOTE ADULT - ASSESSMENT
89 Y/O F with a past medical history of HTN was admitted on 10/29 s/p fall with a L hip fracture, underwent a total hip replacement on 11/1 and later that evening postoperatively developed chest pain and ruled in for:    NSTEMI s/p L total hip replacement     - aspirin, Ranexa, metoprolol, Lisinopril and Lipitor   - s/p transfusion of 1 unit PRBC   - H/H 10.8->8.8->9.8   - H/H now stable    - cardiology following   - DC planning

## 2021-11-08 ENCOUNTER — TRANSCRIPTION ENCOUNTER (OUTPATIENT)
Age: 86
End: 2021-11-08

## 2021-11-08 VITALS — HEART RATE: 90 BPM | SYSTOLIC BLOOD PRESSURE: 118 MMHG | RESPIRATION RATE: 24 BRPM | DIASTOLIC BLOOD PRESSURE: 58 MMHG

## 2021-11-08 LAB
ALBUMIN SERPL ELPH-MCNC: 2.8 G/DL — LOW (ref 3.5–5.2)
ALP SERPL-CCNC: 45 U/L — SIGNIFICANT CHANGE UP (ref 30–115)
ALT FLD-CCNC: 12 U/L — SIGNIFICANT CHANGE UP (ref 0–41)
ANION GAP SERPL CALC-SCNC: 8 MMOL/L — SIGNIFICANT CHANGE UP (ref 7–14)
AST SERPL-CCNC: 21 U/L — SIGNIFICANT CHANGE UP (ref 0–41)
BASOPHILS # BLD AUTO: 0.02 K/UL — SIGNIFICANT CHANGE UP (ref 0–0.2)
BASOPHILS NFR BLD AUTO: 0.3 % — SIGNIFICANT CHANGE UP (ref 0–1)
BILIRUB SERPL-MCNC: 1 MG/DL — SIGNIFICANT CHANGE UP (ref 0.2–1.2)
BLD GP AB SCN SERPL QL: SIGNIFICANT CHANGE UP
BUN SERPL-MCNC: 18 MG/DL — SIGNIFICANT CHANGE UP (ref 10–20)
CALCIUM SERPL-MCNC: 8.1 MG/DL — LOW (ref 8.5–10.1)
CHLORIDE SERPL-SCNC: 106 MMOL/L — SIGNIFICANT CHANGE UP (ref 98–110)
CO2 SERPL-SCNC: 26 MMOL/L — SIGNIFICANT CHANGE UP (ref 17–32)
CREAT SERPL-MCNC: 0.7 MG/DL — SIGNIFICANT CHANGE UP (ref 0.7–1.5)
EOSINOPHIL # BLD AUTO: 0.24 K/UL — SIGNIFICANT CHANGE UP (ref 0–0.7)
EOSINOPHIL NFR BLD AUTO: 3.7 % — SIGNIFICANT CHANGE UP (ref 0–8)
GLUCOSE SERPL-MCNC: 109 MG/DL — HIGH (ref 70–99)
HCT VFR BLD CALC: 29.9 % — LOW (ref 37–47)
HGB BLD-MCNC: 9.6 G/DL — LOW (ref 12–16)
IMM GRANULOCYTES NFR BLD AUTO: 1.6 % — HIGH (ref 0.1–0.3)
LYMPHOCYTES # BLD AUTO: 1.4 K/UL — SIGNIFICANT CHANGE UP (ref 1.2–3.4)
LYMPHOCYTES # BLD AUTO: 21.7 % — SIGNIFICANT CHANGE UP (ref 20.5–51.1)
MAGNESIUM SERPL-MCNC: 1.8 MG/DL — SIGNIFICANT CHANGE UP (ref 1.8–2.4)
MCHC RBC-ENTMCNC: 30.5 PG — SIGNIFICANT CHANGE UP (ref 27–31)
MCHC RBC-ENTMCNC: 32.1 G/DL — SIGNIFICANT CHANGE UP (ref 32–37)
MCV RBC AUTO: 94.9 FL — SIGNIFICANT CHANGE UP (ref 81–99)
MONOCYTES # BLD AUTO: 0.42 K/UL — SIGNIFICANT CHANGE UP (ref 0.1–0.6)
MONOCYTES NFR BLD AUTO: 6.5 % — SIGNIFICANT CHANGE UP (ref 1.7–9.3)
NEUTROPHILS # BLD AUTO: 4.26 K/UL — SIGNIFICANT CHANGE UP (ref 1.4–6.5)
NEUTROPHILS NFR BLD AUTO: 66.2 % — SIGNIFICANT CHANGE UP (ref 42.2–75.2)
NRBC # BLD: 0 /100 WBCS — SIGNIFICANT CHANGE UP (ref 0–0)
PHOSPHATE SERPL-MCNC: 2.6 MG/DL — SIGNIFICANT CHANGE UP (ref 2.1–4.9)
PLATELET # BLD AUTO: 349 K/UL — SIGNIFICANT CHANGE UP (ref 130–400)
POTASSIUM SERPL-MCNC: 4.4 MMOL/L — SIGNIFICANT CHANGE UP (ref 3.5–5)
POTASSIUM SERPL-SCNC: 4.4 MMOL/L — SIGNIFICANT CHANGE UP (ref 3.5–5)
PROT SERPL-MCNC: 4.4 G/DL — LOW (ref 6–8)
RBC # BLD: 3.15 M/UL — LOW (ref 4.2–5.4)
RBC # FLD: 14.8 % — HIGH (ref 11.5–14.5)
SARS-COV-2 RNA SPEC QL NAA+PROBE: SIGNIFICANT CHANGE UP
SODIUM SERPL-SCNC: 140 MMOL/L — SIGNIFICANT CHANGE UP (ref 135–146)
WBC # BLD: 6.44 K/UL — SIGNIFICANT CHANGE UP (ref 4.8–10.8)
WBC # FLD AUTO: 6.44 K/UL — SIGNIFICANT CHANGE UP (ref 4.8–10.8)

## 2021-11-08 PROCEDURE — 99239 HOSP IP/OBS DSCHRG MGMT >30: CPT

## 2021-11-08 PROCEDURE — 93010 ELECTROCARDIOGRAM REPORT: CPT

## 2021-11-08 PROCEDURE — 99232 SBSQ HOSP IP/OBS MODERATE 35: CPT

## 2021-11-08 RX ADMIN — PANTOPRAZOLE SODIUM 40 MILLIGRAM(S): 20 TABLET, DELAYED RELEASE ORAL at 06:12

## 2021-11-08 RX ADMIN — Medication 12.5 MILLIGRAM(S): at 06:12

## 2021-11-08 RX ADMIN — ENOXAPARIN SODIUM 40 MILLIGRAM(S): 100 INJECTION SUBCUTANEOUS at 11:16

## 2021-11-08 RX ADMIN — CHLORHEXIDINE GLUCONATE 1 APPLICATION(S): 213 SOLUTION TOPICAL at 06:13

## 2021-11-08 RX ADMIN — Medication 81 MILLIGRAM(S): at 06:12

## 2021-11-08 RX ADMIN — RANOLAZINE 500 MILLIGRAM(S): 500 TABLET, FILM COATED, EXTENDED RELEASE ORAL at 06:12

## 2021-11-08 NOTE — PROGRESS NOTE ADULT - ASSESSMENT
89 Y/O F with a past medical history of HTN was admitted on 10/29 s/p fall with a L hip fracture, underwent a total hip replacement on 11/1 and later that evening postoperatively developed chest pain and ruled in for:    NSTEMI s/p L total hip replacement     - aspirin, Ranexa, metoprolol, Lisinopril and Lipitor   - s/p transfusion of 1 unit PRBC   - H/H 10.8->8.8->9.6   - H/H now stable    - may DC with medical management as per cardiology    - 34min spent on DC

## 2021-11-08 NOTE — PROGRESS NOTE ADULT - SUBJECTIVE AND OBJECTIVE BOX
Patient has no chest or hip pain today      T(F): 98.4 (11-03-21 @ 11:00), Max: 98.9 (11-02-21 @ 19:00)  HR: 86 (11-03-21 @ 12:00)  BP: 123/72 (11-03-21 @ 12:00)  RR: 18  SpO2: 95% (11-03-21 @ 12:00) (90% - 97%)    PHYSICAL EXAM:  GENERAL: NAD  HEAD:  Atraumatic, Normocephalic  EYES: EOMI, PERRLA, conjunctiva and sclera clear  NERVOUS SYSTEM:  Alert & Oriented X3, no focal deficits   CHEST/LUNG: Clear to percussion bilaterally; No rales, rhonchi, wheezing, or rubs  HEART: Regular rate and rhythm; No murmurs, rubs, or gallops  ABDOMEN: Soft, Nontender, Nondistended; Bowel sounds present  EXTREMITIES:  2+ Peripheral Pulses, No clubbing, cyanosis, or edema    LABS  11-03    140  |  107  |  19  ----------------------------<  148<H>  3.6   |  19  |  0.7    Ca    8.2<L>      03 Nov 2021 05:59  Mg     1.9     11-03    TPro  4.6<L>  /  Alb  2.8<L>  /  TBili  0.6  /  DBili  x   /  AST  51<H>  /  ALT  11  /  AlkPhos  40  11-03                          9.4    9.28  )-----------( 202      ( 03 Nov 2021 12:26 )             28.3         CARDIAC ENZYMES  Creatine Kinase, Serum: 321 (11-02 @ 15:54)    CKMB Units: 16.0 (11-02 @ 15:54)  CKMB Units: 3.6 (11-01 @ 17:50)    Troponin T, Serum: 0.50 ng/mL (11-03-21 @ 05:59)  Troponin T, Serum: 0.23 ng/mL (11-02-21 @ 19:46)  Troponin T, Serum: 0.21 ng/mL (11-02-21 @ 07:24)  Troponin T, Serum: 0.15 ng/mL (11-01-21 @ 21:32)  Troponin T, Serum: <0.01 ng/mL (11-01-21 @ 17:50)    < from: 12 Lead ECG (11.03.21 @ 02:08) >    Diagnosis Line Sinus tachycardia with Premature ventricular complexes or Fusion complexes  Left axis deviation  Septal infarct , age undetermined  Abnormal ECG    < end of copied text >    < from: TTE Echo Complete w/o Contrast w/ Doppler (11.02.21 @ 10:41) >  Summary:   1. Left ventricular ejection fraction, by visual estimation, is 55 to 60%.   2. Normal left atrial size.   3. Moderate mitral valve regurgitation.   4. Mild-moderate tricuspid regurgitation.   5. Estimated pulmonary artery systolic pressure is 37.0 mmHg assuming a right atrial pressure of 3 mmHg, which is consistent with borderline pulmonary hypertension.    < end of copied text >      RADIOLOGY  < from: Xray Pelvis AP only (11.01.21 @ 17:19) >  impression: Post interval resection of the femoral head and neck and placement of total hip arthroplasty in anatomic alignment on AP view with postsurgical changes as swelling and soft tissue air in the left thigh. There is moderate osteoarthritis of the right hip.    < end of copied text >    MEDICATIONS  (STANDING):  acetaminophen     Tablet .. 650 milliGRAM(s) Oral every 6 hours  aspirin  chewable 81 milliGRAM(s) Oral two times a day  celecoxib 200 milliGRAM(s) Oral daily  chlorhexidine 4% Liquid 1 Application(s) Topical <User Schedule>  enoxaparin Injectable 40 milliGRAM(s) SubCutaneous <User Schedule>  lisinopril 5 milliGRAM(s) Oral daily  metoprolol tartrate 12.5 milliGRAM(s) Oral two times a day  pantoprazole    Tablet 40 milliGRAM(s) Oral before breakfast  ranolazine 500 milliGRAM(s) Oral two times a day  senna 2 Tablet(s) Oral at bedtime    MEDICATIONS  (PRN):  nitroglycerin     SubLingual 0.4 milliGRAM(s) SubLingual every 5 minutes PRN Chest Pain  ondansetron Injectable 4 milliGRAM(s) IV Push every 6 hours PRN Nausea and/or Vomiting  traMADol 50 milliGRAM(s) Oral every 6 hours PRN Severe Pain (7 - 10)    
 Patient is comfortable in bed, no chest pain      T(F): 96.5 (11-05-21 @ 07:04), Max: 98.3 (11-04-21 @ 23:01)  HR: 68 (11-05-21 @ 07:17)  BP: 87/51 (11-05-21 @ 07:17)  RR: 18 (11-05-21 @ 07:04)  SpO2: 98% (11-05-21 @ 07:17) (96% - 98%)    PHYSICAL EXAM:  GENERAL: NAD  HEAD:  Atraumatic, Normocephalic  EYES: EOMI, PERRLA, conjunctiva and sclera clear  NERVOUS SYSTEM:  Alert & Oriented X3, no focal deficits   CHEST/LUNG: Clear to percussion bilaterally; No rales, rhonchi, wheezing, or rubs  HEART: Regular rate and rhythm; No murmurs, rubs, or gallops  ABDOMEN: Soft, Nontender, Nondistended; Bowel sounds present  EXTREMITIES:  2+ Peripheral Pulses, No clubbing, cyanosis, or edema    LABS  11-05    139  |  107  |  24<H>  ----------------------------<  112<H>  3.7   |  24  |  0.7    Ca    7.8<L>      05 Nov 2021 06:04  Phos  2.1     11-05  Mg     1.9     11-05    TPro  4.1<L>  /  Alb  2.7<L>  /  TBili  0.7  /  DBili  x   /  AST  33  /  ALT  12  /  AlkPhos  41  11-05                          8.9    6.87  )-----------( 215      ( 05 Nov 2021 06:04 )             26.8         CARDIAC ENZYMES  Creatine Kinase, Serum: 321 (11-02 @ 15:54)    CKMB Units: 16.0 (11-02 @ 15:54)    Troponin T, Serum: 0.50 ng/mL (11-03-21 @ 05:59)  Troponin T, Serum: 0.23 ng/mL (11-02-21 @ 19:46)    < from: 12 Lead ECG (11.04.21 @ 09:04) >  Diagnosis Line Normal sinus rhythm with sinus arrhythmia  Left axis deviation  Nonspecific ST and T wave abnormality    < end of copied text >  < from: TTE Echo Complete w/o Contrast w/ Doppler (11.02.21 @ 10:41) >    Summary:   1. Left ventricular ejection fraction, by visual estimation, is 55 to 60%.   2. Normal left atrial size.   3. Moderate mitral valve regurgitation.   4. Mild-moderate tricuspid regurgitation.   5. Estimated pulmonary artery systolic pressure is 37.0 mmHg assuming a right atrial pressure of 3 mmHg, which is consistent with borderline pulmonary hypertension.    < end of copied text >    RADIOLOGY  < from: Xray Chest 1 View AP/PA (10.29.21 @ 21:13) >  Impression:    No radiographic evidence of acute cardiopulmonary disease.      < end of copied text >    MEDICATIONS  (STANDING):  aspirin  chewable 81 milliGRAM(s) Oral two times a day  atorvastatin 20 milliGRAM(s) Oral at bedtime  celecoxib 200 milliGRAM(s) Oral daily  chlorhexidine 4% Liquid 1 Application(s) Topical <User Schedule>  enoxaparin Injectable 40 milliGRAM(s) SubCutaneous daily  lisinopril 5 milliGRAM(s) Oral daily  metoprolol tartrate 12.5 milliGRAM(s) Oral two times a day  pantoprazole    Tablet 40 milliGRAM(s) Oral before breakfast  ranolazine 500 milliGRAM(s) Oral two times a day  senna 2 Tablet(s) Oral at bedtime    MEDICATIONS  (PRN):  nitroglycerin     SubLingual 0.4 milliGRAM(s) SubLingual every 5 minutes PRN Chest Pain  ondansetron Injectable 4 milliGRAM(s) IV Push every 6 hours PRN Nausea and/or Vomiting  traMADol 50 milliGRAM(s) Oral every 6 hours PRN Severe Pain (7 - 10)    
GRZEGORZ BURT 89y Female  MRN#: [id number]   Hospital Day: 5d    SUBJECTIVE  Patient is a 89y old Female who presents with a chief complaint of fall (03 Nov 2021 10:34)  Currently admitted to medicine with the primary diagnosis of Hip fracture      INTERVAL HPI AND OVERNIGHT EVENTS:  Patient was examined and seen at bedside.  No acute overnight events       OBJECTIVE  PAST MEDICAL & SURGICAL HISTORY  HTN, age 0-18      ALLERGIES:  NKDA (Unknown)  Shrimp (Rash)    MEDICATIONS:  STANDING MEDICATIONS  acetaminophen     Tablet .. 650 milliGRAM(s) Oral every 6 hours  aspirin  chewable 81 milliGRAM(s) Oral two times a day  celecoxib 200 milliGRAM(s) Oral daily  chlorhexidine 4% Liquid 1 Application(s) Topical <User Schedule>  enoxaparin Injectable 40 milliGRAM(s) SubCutaneous <User Schedule>  lisinopril 5 milliGRAM(s) Oral daily  metoprolol tartrate 12.5 milliGRAM(s) Oral two times a day  pantoprazole    Tablet 40 milliGRAM(s) Oral before breakfast  ranolazine 500 milliGRAM(s) Oral two times a day  senna 2 Tablet(s) Oral at bedtime    PRN MEDICATIONS  nitroglycerin     SubLingual 0.4 milliGRAM(s) SubLingual every 5 minutes PRN  ondansetron Injectable 4 milliGRAM(s) IV Push every 6 hours PRN  traMADol 50 milliGRAM(s) Oral every 6 hours PRN      VITAL SIGNS: Last 24 Hours  T(C): 36.9 (03 Nov 2021 11:00), Max: 37.2 (02 Nov 2021 19:00)  T(F): 98.4 (03 Nov 2021 11:00), Max: 98.9 (02 Nov 2021 19:00)  HR: 86 (03 Nov 2021 12:00) (81 - 95)  BP: 123/72 (03 Nov 2021 12:00) (89/50 - 123/72)  BP(mean): 92 (03 Nov 2021 12:00) (64 - 92)  RR: 21 (03 Nov 2021 12:00) (19 - 31)  SpO2: 95% (03 Nov 2021 12:00) (90% - 97%)    LABS:                        9.4    9.28  )-----------( 202      ( 03 Nov 2021 12:26 )             28.3     11-03    140  |  107  |  19  ----------------------------<  148<H>  3.6   |  19  |  0.7    Ca    8.2<L>      03 Nov 2021 05:59  Mg     1.9     11-03    TPro  4.6<L>  /  Alb  2.8<L>  /  TBili  0.6  /  DBili  x   /  AST  51<H>  /  ALT  11  /  AlkPhos  40  11-03          Troponin T, Serum: 0.50 ng/mL *HH* (11-03-21 @ 05:59)  Troponin T, Serum: 0.23 ng/mL *HH* (11-02-21 @ 19:46)  Creatine Kinase, Serum: 321 U/L *H* (11-02-21 @ 15:54)      CARDIAC MARKERS ( 03 Nov 2021 05:59 )  x     / 0.50 ng/mL / x     / x     / x      CARDIAC MARKERS ( 02 Nov 2021 19:46 )  x     / 0.23 ng/mL / x     / x     / x      CARDIAC MARKERS ( 02 Nov 2021 15:54 )  x     / x     / 321 U/L / x     / 16.0 ng/mL  CARDIAC MARKERS ( 02 Nov 2021 07:24 )  x     / 0.21 ng/mL / x     / x     / x      CARDIAC MARKERS ( 01 Nov 2021 21:32 )  x     / 0.15 ng/mL / x     / x     / x      CARDIAC MARKERS ( 01 Nov 2021 17:50 )  x     / <0.01 ng/mL / x     / x     / 3.6 ng/mL      RADIOLOGY:  < from: Xray Pelvis AP only (11.01.21 @ 17:19) >  Findings/  impression: Post interval resection of the femoral head and neck and placement of total hip arthroplasty in anatomic alignment on AP view with postsurgical changes as swelling and soft tissue air in the left thigh. There is moderate osteoarthritis of the right hip.    < end of copied text >      PHYSICAL EXAM:  CONSTITUTIONAL: No acute distress, well-developed, well-groomed, AAOx3  HEAD: Atraumatic, normocephalic  EYES: EOM intact, PERRLA, conjunctiva and sclera clear  ENT: Supple, no masses, no thyromegaly, no bruits, no JVD; moist mucous membranes  PULMONARY: Clear to auscultation bilaterally; no wheezes, rales, or rhonchi  CARDIOVASCULAR: Regular rate and rhythm; no murmurs, rubs, or gallops  GASTROINTESTINAL: Soft, non-tender, non-distended; bowel sounds present  MUSCULOSKELETAL: 2+ peripheral pulses; no clubbing, no cyanosis, no edema; left hip incision c/d/i   NEUROLOGY: non-focal      ASSESSMENT & PLAN  89y F with PMH of HTN presents with CC of fall, found to have left hip fracture.    Pt underwent  ORIF on 11/1, shortly after procedure  she developed chest discomfort upgraded to telemetry with EKG changed, cardiac enzymes came back abnormal, pt was consulted by cardiology, currently on ASA, BB. In last 24 hours Hb dropped from 10.8 to 8.8 and pt developed orthostatic hypotension pt received one liter of NS as a bolus, repeat CBC at 11 Hb 9.4  Case d/w cardiology and pt's son who is an orthopedic surgeon, will upgraded to CCU.        A/P     #Left Femoral Neck Fracture s/p Mechanical Fall  POD #2  PT/Rehab f/u  Tylenol 1000mg Q6hrs PRN for mild-mod pain  Tramadol 50mg Q6hrs PRN for Sever pain  Left Incision wound is c/d/i     #NSTEMI  Patient has no active chest pain today; was given   Troponins have uptrended from .23 to .5  There are new ST depressions along the precordial leads   c/w ASA 81mg PO BID  Ranexa 500mg PO BID started as well   c/w lopressor 12.5mg PO BID  telemetry monitoring  LDL 61  2Decho noted ( normal EF, borderline PHTN)   keep Hb above 8.0     # Acute blood loss anemia  Patient's Hgb was 8.2, baseline was 10 prior to admission  s/p 1 U PRBC with appropraite improvement  11AM Hgb was 9.4  Keep active T+S      #  bacteruria/ low grade fever   - on  Rocephin--->completed course  - f/u urine Cx     # HTN  - DASH diet   - Lisinopril 5mg QD    CODE: FULL  DIET: DASH/TLC,  DVT PPX: HSQ  GI PPX: n/a  Dispo: Acute  
ORTHO PROGRESS NOTE       89y Female POD # 1     S/P Total Hip Arthroplasty for femoral neck fracture     Patient seen and examined at bedside . The patient is awake and alert in NAD. No complaints of chest pain, SOB, N/V. Pt became dizzy when ambulating with pt this am. She has no complaints of pain    PAST MEDICAL & SURGICAL HISTORY:  HTN, age 0-18          MEDICATIONS  (STANDING):  acetaminophen     Tablet .. 650 milliGRAM(s) Oral every 6 hours  aspirin  chewable 81 milliGRAM(s) Oral two times a day  celecoxib 200 milliGRAM(s) Oral daily  chlorhexidine 4% Liquid 1 Application(s) Topical <User Schedule>  enoxaparin Injectable 40 milliGRAM(s) SubCutaneous <User Schedule>  ketorolac   Injectable 15 milliGRAM(s) IV Push every 6 hours  lisinopril 5 milliGRAM(s) Oral daily  metoprolol tartrate 12.5 milliGRAM(s) Oral two times a day  pantoprazole    Tablet 40 milliGRAM(s) Oral before breakfast  senna 2 Tablet(s) Oral at bedtime  sodium chloride 0.9%. 1000 milliLiter(s) (75 mL/Hr) IV Continuous <Continuous>    MEDICATIONS  (PRN):  nitroglycerin     SubLingual 0.4 milliGRAM(s) SubLingual every 5 minutes PRN Chest Pain  ondansetron Injectable 4 milliGRAM(s) IV Push every 6 hours PRN Nausea and/or Vomiting  traMADol 50 milliGRAM(s) Oral every 6 hours PRN Severe Pain (7 - 10)        Vital Signs Last 24 Hrs  T(C): 37.1 (02 Nov 2021 08:30), Max: 37.3 (02 Nov 2021 00:05)  T(F): 98.7 (02 Nov 2021 08:30), Max: 99.2 (02 Nov 2021 00:05)  HR: 90 (02 Nov 2021 08:30) (62 - 97)  BP: 138/61 (02 Nov 2021 08:30) (85/51 - 155/70)  BP(mean): --  RR: 16 (02 Nov 2021 08:30) (15 - 18)  SpO2: 97% (01 Nov 2021 18:14) (95% - 98%)                          8.8    9.67  )-----------( 189      ( 02 Nov 2021 07:24 )             26.6     11-02    137  |  106  |  15  ----------------------------<  125<H>  4.0   |  24  |  0.7    Ca    8.2<L>      02 Nov 2021 07:24  Mg     1.9     11-01    TPro  5.1<L>  /  Alb  3.4<L>  /  TBili  0.6  /  DBili  x   /  AST  16  /  ALT  10  /  AlkPhos  46  11-01              PE:  The patient was seen and examined at bedside          A&OX3, NAD          Aquacel  dressing C/D/I          Compartments soft, BLE SCD in place          NVI, SILT           A/P:              POD #  1     s/p Total Hip Arthroplasty for femoral neck fracture   , post op chest pain                     OOB to Chair            Physical Therapy- wbat           Pain control - per pain protocol            Incentive Spirometry            DVT Prophylaxis - aspirin           GI ppx- continue Protonix                        Dispo: pending
ORTHOPEDIC SURGERY PROGRESS NOTE:    GRZEGORZ BURT  [id number]    89y Female s/p left MARIA VICTORIA after femoral neck fracture POD#4. Patient seen and examined this morning at bedside, doing well, pain well controlled. No overnight events. Patient has been working with PT post operatively. Denies any numbness/tingling in the extremities, denies CP/SOB/N/V/D.     T(F): 96.5 (11-05-21 @ 07:04), Max: 98.3 (11-04-21 @ 23:01)  HR: 68 (11-05-21 @ 07:17) (68 - 87)  BP: 87/51 (11-05-21 @ 07:17) (82/48 - 122/81)  RR: 18 (11-05-21 @ 07:04) (16 - 25)  SpO2: 98% (11-05-21 @ 07:17) (96% - 98%)    PHYSICAL EXAM:   Patient laying in bed, in NAD, unlabored breathing on RA     LLE:  Dressing in place c/d/i  SILT sp/dp/t/sural/saph  Firing ta/ehl/fhl/gs  compartments soft and compressible  Foot WWP, 2+ DP pulse      LABS:                        8.9    6.87  )-----------( 215      ( 05 Nov 2021 06:04 )             26.8     11-05    139  |  107  |  24<H>  ----------------------------<  112<H>  3.7   |  24  |  0.7    Ca    7.8<L>      05 Nov 2021 06:04  Phos  2.1     11-05  Mg     1.9     11-05    TPro  4.1<L>  /  Alb  2.7<L>  /  TBili  0.7  /  DBili  x   /  AST  33  /  ALT  12  /  AlkPhos  41  11-05          A/P: 89y Female s/pleft maria victoria after femoral neck fracture POD # 4  -Weight Bearing Status: wbat  -Pain control  -DVT ppx per primary  -Incentive spirometry  -PT/OT  -post op antibiotics complete  -Dispo planning  
S: she tolerated PT nicely for the first time amb with a walker and not having light headedness or chest pain       Her postop course was complicated by chest discomfort, orthostasis, anemia requiring a transfusion and treatment in the CCU.         Cardio placed her on Ranexa.    O: VSS BP is stable and on the low side  no calf tenderness   pleasant and cooperative  L: CTA      A/P: Rehab of left hip subcapital FX, s/p Left THR, MI, orthostasis         No lightheadedness or chest pain amb with PT today         Normally she is quite active.          No 4A acute rehab beds are available today, Sat. or Sun.          If she is still here on Monday we could revisit her to evaluate for possible 4A admission.          Cont PT and OT. 
 Patient is a 89y old  Female who presents with a chief complaint of fall (08 Nov 2021 07:39)      T(F): 96 (11-08-21 @ 07:01), Max: 98.3 (11-07-21 @ 23:01)  HR: 90 (11-08-21 @ 15:10)  BP: 118/58 (11-08-21 @ 15:10)  RR: 24 (11-08-21 @ 15:10)  SpO2: --    PHYSICAL EXAM:  GENERAL: NAD  HEAD:  Atraumatic, Normocephalic  EYES: EOMI, PERRLA, conjunctiva and sclera clear  NERVOUS SYSTEM:  Alert & Oriented X3, no focal deficits   CHEST/LUNG: Clear to percussion bilaterally; No rales, rhonchi, wheezing, or rubs  HEART: Regular rate and rhythm; No murmurs, rubs, or gallops  ABDOMEN: Soft, Nontender, Nondistended; Bowel sounds present  EXTREMITIES:  2+ Peripheral Pulses, No clubbing, cyanosis, or edema    LABS  11-08    140  |  106  |  18  ----------------------------<  109<H>  4.4   |  26  |  0.7    Ca    8.1<L>      08 Nov 2021 05:44  Phos  2.6     11-08  Mg     1.8     11-08    TPro  4.4<L>  /  Alb  2.8<L>  /  TBili  1.0  /  DBili  x   /  AST  21  /  ALT  12  /  AlkPhos  45  11-08                          9.6    6.44  )-----------( 349      ( 08 Nov 2021 05:44 )             29.9         CARDIAC ENZYMES  Creatine Kinase, Serum: 177 (11-06 @ 15:27)    CKMB Units: 2.4 (11-06 @ 15:27)    Troponin T, Serum: 1.88 ng/mL (11-06-21 @ 15:27)    < from: TTE Echo Complete w/o Contrast w/ Doppler (11.02.21 @ 10:41) >  Summary:   1. Left ventricular ejection fraction, by visual estimation, is 55 to 60%.   2. Normal left atrial size.   3. Moderate mitral valve regurgitation.   4. Mild-moderate tricuspid regurgitation.   5. Estimated pulmonary artery systolic pressure is 37.0 mmHg assuming a right atrial pressure of 3 mmHg, which is consistent with borderline pulmonary hypertension.    < end of copied text >      MEDICATIONS  (STANDING):  aspirin  chewable 81 milliGRAM(s) Oral two times a day  atorvastatin 20 milliGRAM(s) Oral at bedtime  celecoxib 200 milliGRAM(s) Oral daily  chlorhexidine 4% Liquid 1 Application(s) Topical <User Schedule>  enoxaparin Injectable 40 milliGRAM(s) SubCutaneous daily  lisinopril 5 milliGRAM(s) Oral daily  metoprolol tartrate 12.5 milliGRAM(s) Oral two times a day  pantoprazole    Tablet 40 milliGRAM(s) Oral before breakfast  ranolazine 500 milliGRAM(s) Oral two times a day  senna 2 Tablet(s) Oral at bedtime    MEDICATIONS  (PRN):  nitroglycerin     SubLingual 0.4 milliGRAM(s) SubLingual every 5 minutes PRN Chest Pain  ondansetron Injectable 4 milliGRAM(s) IV Push every 6 hours PRN Nausea and/or Vomiting  traMADol 50 milliGRAM(s) Oral every 6 hours PRN Severe Pain (7 - 10)    
 89y old Female who presents with a chief complaint of fall , admitted to medicine with the primary diagnosis of Hip fracture after mechanical fall, pt was originally admitted to [place], consulted by ortho and transferred to [facility] for surgery,  NPO after for OR. Pt was started on ABx for suspected UTI on 10/31.   Today pt feels OK and denies any complaints, asking about the time of the surgery, her son is at the bedside     PAST MEDICAL & SURGICAL HISTORY  HTN, age [age]      SOCIAL HISTORY:  Negative for smoking/alcohol/drug use.     ALLERGIES:  NKDA (Unknown)  Shrimp (Rash)      VITALS:   T(C): 36.5 (2021 13:17), Max: 37.6 (2021 05:17)  T(F): 97.7 (2021 12:51), Max: 99.6 (2021 05:17)  HR: 91 (2021 13:17) (75 - 94)  BP: 155/70 (2021 13:17) (134/73 - 155/70)  BP(mean): --  RR: 16 (2021 13:17) (16 - 16)  SpO2: --    PHYSICAL EXAM:  GEN: No acute distress  LUNGS: Clear to auscultation bilaterally   HEART: S1/S2 present. RRR.   ABD: Soft, non-tender, non-distended. Bowel sounds present  EXT: left sided lower ext tenderness on palpation, limited ROM due to pain   NEURO: AAOX3, pt has no focal motor or sensory deficit     LABS:                          10.8   7.92  )-----------( 191      ( 2021 08:03 )             33.0       137  |  105  |  11  ----------------------------<  121<H>  3.8   |  24  |  0.6<L>    Ca    8.2<L>      2021 08:03  Mg     1.9         TPro  5.1<L>  /  Alb  3.4<L>  /  TBili  0.6  /  DBili  x   /  AST  16  /  ALT  10  /  AlkPhos  46         PTT - ( 30 Oct 2021 07:25 )  PTT:29.0 sec  Urinalysis Basic - ( 29 Oct 2021 20:01 )    Color: Yellow / Appearance: Slightly Turbid / S.023 / pH: x  Gluc: x / Ketone: Small  / Bili: Negative / Urobili: <2 mg/dL   Blood: x / Protein: 30 mg/dL / Nitrite: Negative   Leuk Esterase: Large / RBC: 3 /HPF / WBC 83 /HPF   Sq Epi: x / Non Sq Epi: 7 /HPF / Bacteria: Few  Lactate, Blood: 1.9 mmol/L (10-29-21 @ 19:57)      RADIOLOGY:     < from: Xray Knee 1 or 2 Views, Left (10.30.21 @ 08:33) >    Findings/  impression: No acute fracture or dislocation.    < end of copied text >  < from: CT Pelvis No Cont (10.29.21 @ 22:31) >  IMPRESSION:    Acute, minimally displaced subcapital fracture of the left femoral neck .    Left gluteal intramuscular hematoma, difficult to measure.                          
 Patient is a 89y old  Female who presents with a chief complaint of fall (04 Nov 2021 11:17)      T(F): 97.1 (11-05-21 @ 05:50), Max: 98.3 (11-04-21 @ 23:01)  HR: 75 (11-05-21 @ 05:50)  BP: 101/57 (11-05-21 @ 05:50)  RR: 18 (11-05-21 @ 05:50)  SpO2: 98% (11-05-21 @ 05:50) (96% - 98%)    PHYSICAL EXAM:  GENERAL: NAD, well-groomed, well-developed  HEAD:  Atraumatic, Normocephalic  EYES: EOMI, PERRLA, conjunctiva and sclera clear  ENMT: No tonsillar erythema, exudates, or enlargement; Moist mucous membranes, Good dentition, No lesions  NECK: Supple, No JVD, Normal thyroid  NERVOUS SYSTEM:  Alert & Oriented X3,  Motor Strength 5/5 B/L upper and lower extremities  CHEST/LUNG: Clear to percussion bilaterally; No rales, rhonchi, wheezing, or rubs  HEART: Regular rate and rhythm; No murmurs, rubs, or gallops  ABDOMEN: Soft, Nontender, Nondistended; Bowel sounds present  EXTREMITIES:   No clubbing, cyanosis, or edema  LYMPH: No lymphadenopathy noted  SKIN: No rashes or lesions    labs  11-05    139  |  107  |  24<H>  ----------------------------<  112<H>  3.7   |  24  |  0.7    Ca    7.8<L>      05 Nov 2021 06:04  Phos  2.1     11-05  Mg     1.9     11-05    TPro  4.1<L>  /  Alb  2.7<L>  /  TBili  0.7  /  DBili  x   /  AST  33  /  ALT  12  /  AlkPhos  41  11-05                          8.9    6.87  )-----------( 215      ( 05 Nov 2021 06:04 )             26.8               aspirin  chewable 81 milliGRAM(s) Oral two times a day  atorvastatin 20 milliGRAM(s) Oral at bedtime  celecoxib 200 milliGRAM(s) Oral daily  chlorhexidine 4% Liquid 1 Application(s) Topical <User Schedule>  enoxaparin Injectable 40 milliGRAM(s) SubCutaneous daily  lisinopril 5 milliGRAM(s) Oral daily  metoprolol tartrate 12.5 milliGRAM(s) Oral two times a day  nitroglycerin     SubLingual 0.4 milliGRAM(s) SubLingual every 5 minutes PRN  ondansetron Injectable 4 milliGRAM(s) IV Push every 6 hours PRN  pantoprazole    Tablet 40 milliGRAM(s) Oral before breakfast  ranolazine 500 milliGRAM(s) Oral two times a day  senna 2 Tablet(s) Oral at bedtime  traMADol 50 milliGRAM(s) Oral every 6 hours PRN  
 Patient is a 89y old  Female who presents with a chief complaint of fall (05 Nov 2021 18:37)      T(F): 97.9 (11-06-21 @ 03:05), Max: 98.1 (11-05-21 @ 23:05)  HR: 84 (11-05-21 @ 15:21)  BP: 94/55 (11-05-21 @ 15:21)  RR: 18 (11-05-21 @ 23:05)  SpO2: 97% (11-05-21 @ 12:08) (97% - 98%)    PHYSICAL EXAM:  GENERAL: NAD, well-groomed, well-developed  HEAD:  Atraumatic, Normocephalic  EYES: EOMI, PERRLA, conjunctiva and sclera clear  ENMT: No tonsillar erythema, exudates, or enlargement; Moist mucous membranes, Good dentition, No lesions  NECK: Supple, No JVD, Normal thyroid  NERVOUS SYSTEM:  Alert & Oriented X3,  Motor Strength 5/5 B/L upper and lower extremities  CHEST/LUNG: Clear to percussion bilaterally; No rales, rhonchi, wheezing, or rubs  HEART: Regular rate and rhythm; No murmurs, rubs, or gallops  ABDOMEN: Soft, Nontender, Nondistended; Bowel sounds present  EXTREMITIES:   No clubbing, cyanosis, or edema  LYMPH: No lymphadenopathy noted  SKIN: No rashes or lesions    labs  11-05    139  |  107  |  24<H>  ----------------------------<  112<H>  3.7   |  24  |  0.7    Ca    7.8<L>      05 Nov 2021 06:04  Phos  2.1     11-05  Mg     1.9     11-05    TPro  4.1<L>  /  Alb  2.7<L>  /  TBili  0.7  /  DBili  x   /  AST  33  /  ALT  12  /  AlkPhos  41  11-05                          8.9    6.87  )-----------( 215      ( 05 Nov 2021 06:04 )             26.8               aspirin  chewable 81 milliGRAM(s) Oral two times a day  atorvastatin 20 milliGRAM(s) Oral at bedtime  celecoxib 200 milliGRAM(s) Oral daily  chlorhexidine 4% Liquid 1 Application(s) Topical <User Schedule>  enoxaparin Injectable 40 milliGRAM(s) SubCutaneous daily  lisinopril 5 milliGRAM(s) Oral daily  metoprolol tartrate 12.5 milliGRAM(s) Oral two times a day  nitroglycerin     SubLingual 0.4 milliGRAM(s) SubLingual every 5 minutes PRN  ondansetron Injectable 4 milliGRAM(s) IV Push every 6 hours PRN  pantoprazole    Tablet 40 milliGRAM(s) Oral before breakfast  ranolazine 500 milliGRAM(s) Oral two times a day  senna 2 Tablet(s) Oral at bedtime  traMADol 50 milliGRAM(s) Oral every 6 hours PRN  
 Patient is a 89y old  Female who presents with a chief complaint of fall (06 Nov 2021 11:06)      T(F): 98.1 (11-06-21 @ 23:01), Max: 98.7 (11-06-21 @ 19:00)  HR: 77 (11-06-21 @ 23:18)  BP: 118/57 (11-06-21 @ 23:18)  RR: 18 (11-06-21 @ 23:18)  SpO2: 97% (11-06-21 @ 07:11) (97% - 97%)    PHYSICAL EXAM:  GENERAL: NAD, well-groomed, well-developed  HEAD:  Atraumatic, Normocephalic  EYES: EOMI, PERRLA, conjunctiva and sclera clear  ENMT: No tonsillar erythema, exudates, or enlargement; Moist mucous membranes, Good dentition, No lesions  NECK: Supple, No JVD, Normal thyroid  NERVOUS SYSTEM:  Alert & Oriented X3,  Motor Strength 5/5 B/L upper and lower extremities  CHEST/LUNG: Clear to percussion bilaterally; No rales, rhonchi, wheezing, or rubs  HEART: Regular rate and rhythm; No murmurs, rubs, or gallops  ABDOMEN: Soft, Nontender, Nondistended; Bowel sounds present  EXTREMITIES:   No clubbing, cyanosis, or edema  LYMPH: No lymphadenopathy noted  SKIN: No rashes or lesions    labs  11-06    140  |  107  |  22<H>  ----------------------------<  108<H>  3.9   |  23  |  0.6<L>    Ca    7.8<L>      06 Nov 2021 06:45  Phos  2.6     11-06  Mg     1.9     11-06    TPro  4.3<L>  /  Alb  2.7<L>  /  TBili  0.9  /  DBili  x   /  AST  25  /  ALT  12  /  AlkPhos  43  11-06                          9.3    6.41  )-----------( 277      ( 06 Nov 2021 06:45 )             27.8           Creatine Kinase, Serum: 177 U/L (11-06-21 @ 15:27)  Troponin T, Serum: 1.88 ng/mL *HH* (11-06-21 @ 15:27)      aspirin  chewable 81 milliGRAM(s) Oral two times a day  atorvastatin 20 milliGRAM(s) Oral at bedtime  celecoxib 200 milliGRAM(s) Oral daily  chlorhexidine 4% Liquid 1 Application(s) Topical <User Schedule>  enoxaparin Injectable 40 milliGRAM(s) SubCutaneous daily  lisinopril 5 milliGRAM(s) Oral daily  metoprolol tartrate 12.5 milliGRAM(s) Oral two times a day  nitroglycerin     SubLingual 0.4 milliGRAM(s) SubLingual every 5 minutes PRN  ondansetron Injectable 4 milliGRAM(s) IV Push every 6 hours PRN  pantoprazole    Tablet 40 milliGRAM(s) Oral before breakfast  ranolazine 500 milliGRAM(s) Oral two times a day  senna 2 Tablet(s) Oral at bedtime  traMADol 50 milliGRAM(s) Oral every 6 hours PRN  
GRZEGORZ BURT 89y Female  MRN#: [id number]   Hospital Day: 6d    SUBJECTIVE  Patient is a 89y old Female who presents with a chief complaint of fall (04 Nov 2021 07:42)  Currently admitted to medicine with the primary diagnosis of Hip fracture      INTERVAL HPI AND OVERNIGHT EVENTS:  Patient was examined and seen at bedside. This morning she is resting comfortably in bed and reports no issues or overnight events.      OBJECTIVE  PAST MEDICAL & SURGICAL HISTORY  HTN, age 0-18      ALLERGIES:  NKDA (Unknown)  Shrimp (Rash)    MEDICATIONS:  STANDING MEDICATIONS  acetaminophen     Tablet .. 650 milliGRAM(s) Oral every 6 hours  aspirin  chewable 81 milliGRAM(s) Oral two times a day  atorvastatin 20 milliGRAM(s) Oral at bedtime  celecoxib 200 milliGRAM(s) Oral daily  chlorhexidine 4% Liquid 1 Application(s) Topical <User Schedule>  enoxaparin Injectable 40 milliGRAM(s) SubCutaneous daily  lisinopril 5 milliGRAM(s) Oral daily  metoprolol tartrate 12.5 milliGRAM(s) Oral two times a day  pantoprazole    Tablet 40 milliGRAM(s) Oral before breakfast  ranolazine 500 milliGRAM(s) Oral two times a day  senna 2 Tablet(s) Oral at bedtime    PRN MEDICATIONS  nitroglycerin     SubLingual 0.4 milliGRAM(s) SubLingual every 5 minutes PRN  ondansetron Injectable 4 milliGRAM(s) IV Push every 6 hours PRN  traMADol 50 milliGRAM(s) Oral every 6 hours PRN      VITAL SIGNS: Last 24 Hours  T(C): 35.1 (04 Nov 2021 11:00), Max: 37.8 (03 Nov 2021 19:00)  T(F): 95.2 (04 Nov 2021 11:00), Max: 100.1 (03 Nov 2021 19:00)  HR: 78 (04 Nov 2021 10:40) (73 - 96)  BP: 93/50 (04 Nov 2021 10:40) (85/48 - 123/72)  BP(mean): 66 (04 Nov 2021 10:40) (62 - 92)  RR: 16 (04 Nov 2021 11:00) (13 - 25)  SpO2: 96% (04 Nov 2021 10:40) (91% - 98%)    LABS:                        9.8    8.17  )-----------( 211      ( 04 Nov 2021 06:02 )             29.7     11-04    141  |  108  |  18  ----------------------------<  114<H>  4.4   |  25  |  0.7    Ca    8.1<L>      04 Nov 2021 06:02  Mg     2.0     11-04    TPro  4.5<L>  /  Alb  2.9<L>  /  TBili  1.0  /  DBili  x   /  AST  48<H>  /  ALT  10  /  AlkPhos  43  11-04              CARDIAC MARKERS ( 03 Nov 2021 05:59 )  x     / 0.50 ng/mL / x     / x     / x      CARDIAC MARKERS ( 02 Nov 2021 19:46 )  x     / 0.23 ng/mL / x     / x     / x      CARDIAC MARKERS ( 02 Nov 2021 15:54 )  x     / x     / 321 U/L / x     / 16.0 ng/mL        RADIOLOGY:  < from: Xray Pelvis AP only (11.01.21 @ 17:19) >  Findings/  impression: Post interval resection of the femoral head and neck and placement of total hip arthroplasty in anatomic alignment on AP view with postsurgical changes as swelling and soft tissue air in the left thigh. There is moderate osteoarthritis of the right hip.    < end of copied text >      PHYSICAL EXAM:  CONSTITUTIONAL: No acute distress, well-developed, well-groomed, AAOx3  HEAD: Atraumatic, normocephalic  EYES: EOM intact, PERRLA, conjunctiva and sclera clear  ENT: Supple, no masses, no thyromegaly, no bruits, no JVD; moist mucous membranes  PULMONARY: Clear to auscultation bilaterally; no wheezes, rales, or rhonchi  CARDIOVASCULAR: Regular rate and rhythm; no murmurs, rubs, or gallops  GASTROINTESTINAL: Soft, non-tender, non-distended; bowel sounds present  MUSCULOSKELETAL: 2+ peripheral pulses; no clubbing, no cyanosis, no edema; left hip incision c/d/i   NEUROLOGY: non-focal      ASSESSMENT & PLAN  89y F with PMH of HTN presents with CC of fall, found to have left hip fracture.    Pt underwent  ORIF on 11/1, shortly after procedure  she developed chest discomfort upgraded to telemetry with EKG changed, cardiac enzymes came back abnormal, pt was consulted by cardiology, currently on ASA, BB. In last 24 hours Hb dropped from 10.8 to 8.8 and pt developed orthostatic hypotension pt received one liter of NS as a bolus, repeat CBC at 11 Hb 9.4  Case d/w cardiology and pt's son who is an orthopedic surgeon, will upgraded to CCU.        #Left Femoral Neck Fracture s/p Mechanical Fall  POD #2  PT/Rehab f/u  Tylenol 1000mg Q6hrs PRN for mild-mod pain  Tramadol 50mg Q6hrs PRN for Sever pain  Left Incision wound is c/d/i     #NSTEMI  Patient has no active chest pain today; was given   Troponins have uptrended from .23 to .5  There are new ST depressions along the precordial leads   c/w ASA 81mg PO BID  Ranexa 500mg PO BID started as well   c/w lopressor 12.5mg PO BID  telemetry monitoring  LDL 61  2Decho noted ( normal EF, borderline PHTN)   keep Hb above 8.0   -->Given no ischemic changes on EKG today, will likely not undergo cardiac cath and will be medically managed  PT consulted to ambulate patient    # Acute blood loss anemia  Patient's Hgb was 8.2, baseline was 10 prior to admission  s/p 1 U PRBC with appropraite improvement   Hgb was 9.8  Keep active T+S      #  bacteruria/ low grade fever   - on  Rocephin--->completed course  - f/u urine Cx     # HTN  - DASH diet   - Lisinopril 5mg QD    CODE: FULL  DIET: DASH/TLC,  DVT PPX: HSQ  GI PPX: n/a  Dispo: Acute      
GRZEGORZ BURT 89y Female  MRN#: [id number]   Hospital Day: 7d    SUBJECTIVE  Patient is a 89y old Female who presents with a chief complaint of fall (05 Nov 2021 10:10)  Currently admitted to medicine with the primary diagnosis of Hip fracture      INTERVAL HPI AND OVERNIGHT EVENTS:  Patient was examined and seen at bedside.  No acute overnight events       OBJECTIVE  PAST MEDICAL & SURGICAL HISTORY  HTN, age 0-18      ALLERGIES:  NKDA (Unknown)  Shrimp (Rash)    MEDICATIONS:  STANDING MEDICATIONS  aspirin  chewable 81 milliGRAM(s) Oral two times a day  atorvastatin 20 milliGRAM(s) Oral at bedtime  celecoxib 200 milliGRAM(s) Oral daily  chlorhexidine 4% Liquid 1 Application(s) Topical <User Schedule>  enoxaparin Injectable 40 milliGRAM(s) SubCutaneous daily  lisinopril 5 milliGRAM(s) Oral daily  metoprolol tartrate 12.5 milliGRAM(s) Oral two times a day  pantoprazole    Tablet 40 milliGRAM(s) Oral before breakfast  ranolazine 500 milliGRAM(s) Oral two times a day  senna 2 Tablet(s) Oral at bedtime    PRN MEDICATIONS  nitroglycerin     SubLingual 0.4 milliGRAM(s) SubLingual every 5 minutes PRN  ondansetron Injectable 4 milliGRAM(s) IV Push every 6 hours PRN  traMADol 50 milliGRAM(s) Oral every 6 hours PRN      VITAL SIGNS: Last 24 Hours  T(C): 35.8 (05 Nov 2021 07:04), Max: 36.8 (04 Nov 2021 23:01)  T(F): 96.5 (05 Nov 2021 07:04), Max: 98.3 (04 Nov 2021 23:01)  HR: 68 (05 Nov 2021 07:17) (68 - 87)  BP: 87/51 (05 Nov 2021 07:17) (82/48 - 122/81)  BP(mean): 64 (05 Nov 2021 07:17) (60 - 98)  RR: 18 (05 Nov 2021 07:04) (16 - 18)  SpO2: 98% (05 Nov 2021 07:17) (98% - 98%)    LABS:                        8.9    6.87  )-----------( 215      ( 05 Nov 2021 06:04 )             26.8     11-05    139  |  107  |  24<H>  ----------------------------<  112<H>  3.7   |  24  |  0.7    Ca    7.8<L>      05 Nov 2021 06:04  Phos  2.1     11-05  Mg     1.9     11-05    TPro  4.1<L>  /  Alb  2.7<L>  /  TBili  0.7  /  DBili  x   /  AST  33  /  ALT  12  /  AlkPhos  41  11-05                  RADIOLOGY:  < from: Xray Pelvis AP only (11.01.21 @ 17:19) >  Findings/  impression: Post interval resection of the femoral head and neck and placement of total hip arthroplasty in anatomic alignment on AP view with postsurgical changes as swelling and soft tissue air in the left thigh. There is moderate osteoarthritis of the right hip.    < end of copied text >      PHYSICAL EXAM:  CONSTITUTIONAL: No acute distress, well-developed, well-groomed, AAOx3  HEAD: Atraumatic, normocephalic  EYES: EOM intact, PERRLA, conjunctiva and sclera clear  ENT: Supple, no masses, no thyromegaly, no bruits, no JVD; moist mucous membranes  PULMONARY: Clear to auscultation bilaterally; no wheezes, rales, or rhonchi  CARDIOVASCULAR: Regular rate and rhythm; no murmurs, rubs, or gallops  GASTROINTESTINAL: Soft, non-tender, non-distended; bowel sounds present  MUSCULOSKELETAL: 2+ peripheral pulses; no clubbing, no cyanosis, no edema; left hip incision c/d/i   NEUROLOGY: non-focal      ASSESSMENT & PLAN  89y F with PMH of HTN presents with CC of fall, found to have left hip fracture.    Pt underwent  ORIF on 11/1, shortly after procedure  she developed chest discomfort upgraded to telemetry with EKG changed, cardiac enzymes came back abnormal, pt was consulted by cardiology, currently on ASA, BB. In last 24 hours Hb dropped from 10.8 to 8.8 and pt developed orthostatic hypotension pt received one liter of NS as a bolus, repeat CBC at 11 Hb 9.4  Case d/w cardiology and pt's son who is an orthopedic surgeon, will upgraded to CCU.        #Left Femoral Neck Fracture s/p Mechanical Fall  POD #2  PT/Rehab f/u  Tylenol 1000mg Q6hrs PRN for mild-mod pain  Tramadol 50mg Q6hrs PRN for Sever pain  Left Incision wound is c/d/i     #NSTEMI  Patient has no active chest pain today; was given   Troponins have uptrended from .23 to .5  There are new ST depressions along the precordial leads   c/w ASA 81mg PO BID  Ranexa 500mg PO BID started as well   c/w lopressor 12.5mg PO BID  telemetry monitoring  LDL 61  2Decho noted ( normal EF, borderline PHTN)   keep Hb above 8.0   -->Given no ischemic changes on EKG today, will likely not undergo cardiac cath and will be medically managed  PT consulted to ambulate patient    # Acute blood loss anemia  Patient's Hgb has downtrended to 8.9   Will repeat CBC in the AM to monitor for possible downtrend  Previous baseline was 13.7 on admission        #  bacteruria/ low grade fever   - on  Rocephin--->completed course  - f/u urine Cx     # HTN  - DASH diet   - Lisinopril 5mg QD    CODE: FULL  DIET: DASH/TLC,  DVT PPX: HSQ  GI PPX: n/a  Dispo: Acute    
POD#1S/P MARIA VICTORIA  T(C): 36.1 (11-03-21 @ 07:01), Max: 37.2 (11-02-21 @ 11:49)  HR: 84 (11-03-21 @ 06:00) (83 - 95)  BP: 104/58 (11-03-21 @ 06:00) (98/55 - 143/67)  RR: 20 (11-03-21 @ 07:01) (16 - 31)  SpO2: 95% (11-03-21 @ 06:00) (90% - 96%)                        8.2    10.14 )-----------( 223      ( 03 Nov 2021 05:59 )             25.2     11-03    140  |  107  |  19  ----------------------------<  148<H>  3.6   |  19  |  0.7    Ca    8.2<L>      03 Nov 2021 05:59  Mg     1.9     11-03    TPro  4.6<L>  /  Alb  2.8<L>  /  TBili  0.6  /  DBili  x   /  AST  51<H>  /  ALT  11  /  AlkPhos  40  11-03    pt is very cheerful this am offers no complaints   PTS PAIN IS CONTROLLED   WOUND: Aquacel is in place the thigh is soft and without hematoma at this time   N/V/I  ABX COMPLETE  DVT PROPHYLAXIS IN PLACE  REHAB pt may be oob and wbat     as per cardiology   Patient had chest pain last night. EKG ant ischemia , Better with ntg. Would transfuse. Will begin ranexa. May need consider cardiac cath. Prognosis guarded    
pt is set for surgery on her hip  discussed with her today
 89y old Female who presents with a chief complaint of fall , admitted to medicine with the primary diagnosis of Hip fracture after mechanical fall, pt was originally admitted to [place], consulted by ortho and transferred to [facility] for surgery,  Pt was started on ABx for suspected UTI on 10/31.   Pt underwent  ORIF on , shortly after procedure  she developed chest discomfort upgraded to telemetry with EKG changed, cardiac enzymes came back abnormal, pt was consulted by cardiology, currently on ASA, BB. In last 24 hours Hb dropped from 10.8 to 8.8 and pt developed orthostatic hypotension pt received one liter of NS as a bolus, will repeat CBC at 11 AM is 9.4  Case d/w cardiology and pt's son who is an orthopedic surgeon, will upgraded to CCU.    Today pt feels OK, c/o head ache, denies hip pain, awake and alert.     PAST MEDICAL & SURGICAL HISTORY  HTN, age 0-18      SOCIAL HISTORY:  Negative for smoking/alcohol/drug use.     ALLERGIES:  NKDA (Unknown)  Shrimp (Rash)      VITALS:   T(C): 37.2 (2021 11:49), Max: 37.3 (2021 00:05)  T(F): 98.9 (2021 11:49), Max: 99.2 (2021 00:05)  HR: 91 (2021 11:49) (62 - 97)  BP: 143/67 (2021 11:49) (85/51 - 155/70)  BP(mean): --  RR: 16 (2021 11:49) (15 - 18)  SpO2: 95% (2021 10:09) (95% - 98%)      PHYSICAL EXAM:  GEN: No acute distress  LUNGS: Clear to auscultation bilaterally   HEART: S1/S2 present. RRR.   ABD: Soft, non-tender, non-distended. Bowel sounds present  EXT: left sided lower ext tenderness on palpation, limited ROM due to pain , surgical site looks intact,  ecchymoses noted closer to left buttock   NEURO: AAOX3, pt has no focal motor or sensory deficit     LABS:                          9.4    13.05 )-----------( 186      ( 2021 11:35 )             28.8       137  |  106  |  15  ----------------------------<  125<H>  4.0   |  24  |  0.7    Ca    8.2<L>      2021 07:24  Mg     1.9         TPro  5.1<L>  /  Alb  3.4<L>  /  TBili  0.6  /  DBili  x   /  AST  16  /  ALT  10  /  AlkPhos  46         PTT - ( 30 Oct 2021 07:25 )  PTT:29.0 sec  Urinalysis Basic - ( 29 Oct 2021 20:01 )    Color: Yellow / Appearance: Slightly Turbid / S.023 / pH: x  Gluc: x / Ketone: Small  / Bili: Negative / Urobili: <2 mg/dL   Blood: x / Protein: 30 mg/dL / Nitrite: Negative   Leuk Esterase: Large / RBC: 3 /HPF / WBC 83 /HPF   Sq Epi: x / Non Sq Epi: 7 /HPF / Bacteria: Few  Lactate, Blood: 1.9 mmol/L (10-29-21 @ 19:57)      RADIOLOGY:     < from: Xray Knee 1 or 2 Views, Left (10.30.21 @ 08:33) >    Findings/  impression: No acute fracture or dislocation.    < end of copied text >  < from: CT Pelvis No Cont (10.29.21 @ 22:31) >  IMPRESSION:    Acute, minimally displaced subcapital fracture of the left femoral neck .    Left gluteal intramuscular hematoma, difficult to measure.  < from: TTE Echo Complete w/o Contrast w/ Doppler (21 @ 10:41) >  Summary:   1. Left ventricular ejection fraction, by visual estimation, is 55 to 60%.   2. Normal left atrial size.   3. Moderate mitral valve regurgitation.   4. Mild-moderate tricuspid regurgitation.   5. Estimated pulmonary artery systolic pressure is 37.0 mmHg assuming a right atrial pressure of 3 mmHg, which is consistent with borderline pulmonary hypertension.    < end of copied text >  < from: 12 Lead ECG (21 @ 11:08) >  Ventricular Rate 85 BPM    Atrial Rate 85 BPM    P-R Interval 154 ms    QRS Duration 80 ms    Q-T Interval 382 ms    QTC Calculation(Bazett) 454 ms    P Axis 46 degrees    R Axis 17 degrees    T Axis 43 degrees    Diagnosis Line Sinus rhythm with Premature supraventricular complexes  Marked ST abnormality, possible inferior subendocardial injury  Anterior ischemia  Abnormal ECG      MEDICATIONS  (STANDING):  acetaminophen     Tablet .. 650 milliGRAM(s) Oral every 6 hours  aspirin  chewable 81 milliGRAM(s) Oral two times a day  celecoxib 200 milliGRAM(s) Oral daily  chlorhexidine 4% Liquid 1 Application(s) Topical <User Schedule>  enoxaparin Injectable 40 milliGRAM(s) SubCutaneous <User Schedule>  ketorolac   Injectable 15 milliGRAM(s) IV Push every 6 hours  lisinopril 5 milliGRAM(s) Oral daily  metoprolol tartrate 12.5 milliGRAM(s) Oral two times a day  pantoprazole    Tablet 40 milliGRAM(s) Oral before breakfast  senna 2 Tablet(s) Oral at bedtime  sodium chloride 0.9% Bolus 1000 milliLiter(s) IV Bolus once  sodium chloride 0.9%. 1000 milliLiter(s) (75 mL/Hr) IV Continuous <Continuous>    MEDICATIONS  (PRN):  nitroglycerin     SubLingual 0.4 milliGRAM(s) SubLingual every 5 minutes PRN Chest Pain  ondansetron Injectable 4 milliGRAM(s) IV Push every 6 hours PRN Nausea and/or Vomiting  traMADol 50 milliGRAM(s) Oral every 6 hours PRN Severe Pain (7 - 10)                              
 Patient is a 89y old  Female who presents with a chief complaint of fall (02 Nov 2021 13:03)      T(F): 97 (11-03-21 @ 07:01), Max: 98.9 (11-02-21 @ 11:49)  HR: 84 (11-03-21 @ 06:00)  BP: 104/58 (11-03-21 @ 06:00)  RR: 20 (11-03-21 @ 07:01)  SpO2: 95% (11-03-21 @ 06:00) (90% - 96%)    PHYSICAL EXAM:  GENERAL: NAD, well-groomed, well-developed  HEAD:  Atraumatic, Normocephalic  EYES: EOMI, PERRLA, conjunctiva and sclera clear  ENMT: No tonsillar erythema, exudates, or enlargement; Moist mucous membranes, Good dentition, No lesions  NECK: Supple, No JVD, Normal thyroid  NERVOUS SYSTEM:  Alert & Oriented X3,  Motor Strength 5/5 B/L upper and lower extremities  CHEST/LUNG: Clear to percussion bilaterally; No rales, rhonchi, wheezing, or rubs  HEART: Regular rate and rhythm; No murmurs, rubs, or gallops  ABDOMEN: Soft, Nontender, Nondistended; Bowel sounds present  EXTREMITIES:   No clubbing, cyanosis, or edema  LYMPH: No lymphadenopathy noted  SKIN: No rashes or lesions    labs  11-02    137  |  106  |  15  ----------------------------<  125<H>  4.0   |  24  |  0.7    Ca    8.2<L>      02 Nov 2021 07:24  Mg     1.9     11-01    TPro  5.1<L>  /  Alb  3.4<L>  /  TBili  0.6  /  DBili  x   /  AST  16  /  ALT  10  /  AlkPhos  46  11-01                          8.2    10.14 )-----------( 223      ( 03 Nov 2021 05:59 )             25.2           Troponin T, Serum: 0.50 ng/mL *HH* (11-03-21 @ 05:59)  Troponin T, Serum: 0.23 ng/mL *HH* (11-02-21 @ 19:46)  Creatine Kinase, Serum: 321 U/L *H* (11-02-21 @ 15:54)      acetaminophen     Tablet .. 650 milliGRAM(s) Oral every 6 hours  aspirin  chewable 81 milliGRAM(s) Oral two times a day  celecoxib 200 milliGRAM(s) Oral daily  chlorhexidine 4% Liquid 1 Application(s) Topical <User Schedule>  enoxaparin Injectable 40 milliGRAM(s) SubCutaneous <User Schedule>  lisinopril 5 milliGRAM(s) Oral daily  metoprolol tartrate 12.5 milliGRAM(s) Oral two times a day  nitroglycerin     SubLingual 0.4 milliGRAM(s) SubLingual every 5 minutes PRN  ondansetron Injectable 4 milliGRAM(s) IV Push every 6 hours PRN  pantoprazole    Tablet 40 milliGRAM(s) Oral before breakfast  senna 2 Tablet(s) Oral at bedtime  traMADol 50 milliGRAM(s) Oral every 6 hours PRN  
 Patient is a 89y old  Female who presents with a chief complaint of fall (03 Nov 2021 13:51)      T(F): 97 (11-04-21 @ 07:01), Max: 100.1 (11-03-21 @ 19:00)  HR: 77 (11-04-21 @ 07:00)  BP: 96/54 (11-04-21 @ 07:00)  RR: 16 (11-04-21 @ 07:01)  SpO2: 98% (11-04-21 @ 07:00) (91% - 98%)    PHYSICAL EXAM:  GENERAL: NAD, well-groomed, well-developed  HEAD:  Atraumatic, Normocephalic  EYES: EOMI, PERRLA, conjunctiva and sclera clear  ENMT: No tonsillar erythema, exudates, or enlargement; Moist mucous membranes, Good dentition, No lesions  NECK: Supple, No JVD, Normal thyroid  NERVOUS SYSTEM:  Alert & Oriented X3,  Motor Strength 5/5 B/L upper and lower extremities  CHEST/LUNG: Clear to percussion bilaterally; No rales, rhonchi, wheezing, or rubs  HEART: Regular rate and rhythm; No murmurs, rubs, or gallops  ABDOMEN: Soft, Nontender, Nondistended; Bowel sounds present  EXTREMITIES:   No clubbing, cyanosis, or edema  LYMPH: No lymphadenopathy noted  SKIN: No rashes or lesions    labs  11-03    140  |  107  |  19  ----------------------------<  148<H>  3.6   |  19  |  0.7    Ca    8.2<L>      03 Nov 2021 05:59  Mg     1.9     11-03    TPro  4.6<L>  /  Alb  2.8<L>  /  TBili  0.6  /  DBili  x   /  AST  51<H>  /  ALT  11  /  AlkPhos  40  11-03                          9.8    8.17  )-----------( 211      ( 04 Nov 2021 06:02 )             29.7               acetaminophen     Tablet .. 650 milliGRAM(s) Oral every 6 hours  aspirin  chewable 81 milliGRAM(s) Oral two times a day  celecoxib 200 milliGRAM(s) Oral daily  chlorhexidine 4% Liquid 1 Application(s) Topical <User Schedule>  enoxaparin Injectable 40 milliGRAM(s) SubCutaneous <User Schedule>  lisinopril 5 milliGRAM(s) Oral daily  metoprolol tartrate 12.5 milliGRAM(s) Oral two times a day  nitroglycerin     SubLingual 0.4 milliGRAM(s) SubLingual every 5 minutes PRN  ondansetron Injectable 4 milliGRAM(s) IV Push every 6 hours PRN  pantoprazole    Tablet 40 milliGRAM(s) Oral before breakfast  ranolazine 500 milliGRAM(s) Oral two times a day  senna 2 Tablet(s) Oral at bedtime  traMADol 50 milliGRAM(s) Oral every 6 hours PRN  
 Patient is a 89y old  Female who presents with a chief complaint of fall (07 Nov 2021 12:19)      T(F): 96 (11-08-21 @ 07:01), Max: 98.3 (11-07-21 @ 23:01)  HR: 66 (11-08-21 @ 07:10)  BP: 125/63 (11-08-21 @ 07:10)  RR: 21 (11-08-21 @ 07:10)  SpO2: --    PHYSICAL EXAM:  GENERAL: NAD, well-groomed, well-developed  HEAD:  Atraumatic, Normocephalic  EYES: EOMI, PERRLA, conjunctiva and sclera clear  ENMT: No tonsillar erythema, exudates, or enlargement; Moist mucous membranes, Good dentition, No lesions  NECK: Supple, No JVD, Normal thyroid  NERVOUS SYSTEM:  Alert & Oriented X3,  Motor Strength 5/5 B/L upper and lower extremities  CHEST/LUNG: Clear to percussion bilaterally; No rales, rhonchi, wheezing, or rubs  HEART: Regular rate and rhythm; No murmurs, rubs, or gallops  ABDOMEN: Soft, Nontender, Nondistended; Bowel sounds present  EXTREMITIES:   No clubbing, cyanosis, or edema  LYMPH: No lymphadenopathy noted  SKIN: No rashes or lesions    labs  11-08    140  |  106  |  18  ----------------------------<  109<H>  4.4   |  26  |  0.7    Ca    8.1<L>      08 Nov 2021 05:44  Phos  2.6     11-08  Mg     1.8     11-08    TPro  4.4<L>  /  Alb  2.8<L>  /  TBili  1.0  /  DBili  x   /  AST  21  /  ALT  12  /  AlkPhos  45  11-08                          9.6    6.44  )-----------( 349      ( 08 Nov 2021 05:44 )             29.9               aspirin  chewable 81 milliGRAM(s) Oral two times a day  atorvastatin 20 milliGRAM(s) Oral at bedtime  celecoxib 200 milliGRAM(s) Oral daily  chlorhexidine 4% Liquid 1 Application(s) Topical <User Schedule>  enoxaparin Injectable 40 milliGRAM(s) SubCutaneous daily  lisinopril 5 milliGRAM(s) Oral daily  metoprolol tartrate 12.5 milliGRAM(s) Oral two times a day  nitroglycerin     SubLingual 0.4 milliGRAM(s) SubLingual every 5 minutes PRN  ondansetron Injectable 4 milliGRAM(s) IV Push every 6 hours PRN  pantoprazole    Tablet 40 milliGRAM(s) Oral before breakfast  ranolazine 500 milliGRAM(s) Oral two times a day  senna 2 Tablet(s) Oral at bedtime  traMADol 50 milliGRAM(s) Oral every 6 hours PRN  
 Patient is comfortable in bed, no CP      T(F): 97.9 (11-07-21 @ 07:12), Max: 98.7 (11-06-21 @ 19:00)  HR: 71 (11-07-21 @ 07:14)  BP: 131/58 (11-07-21 @ 07:14)  RR: 20    PHYSICAL EXAM:  GENERAL: NAD  HEAD:  Atraumatic, Normocephalic  EYES: EOMI, PERRLA, conjunctiva and sclera clear  NERVOUS SYSTEM:  Alert & Oriented X3, no focal deficits   CHEST/LUNG: Clear to percussion bilaterally; No rales, rhonchi, wheezing, or rubs  HEART: Regular rate and rhythm; No murmurs, rubs, or gallops  ABDOMEN: Soft, Nontender, Nondistended; Bowel sounds present  EXTREMITIES:  2+ Peripheral Pulses, No clubbing, cyanosis, or edema    LABS  11-07    139  |  105  |  19  ----------------------------<  112<H>  3.7   |  25  |  0.6<L>    Ca    8.0<L>      07 Nov 2021 06:12  Phos  2.6     11-07  Mg     1.9     11-07    TPro  4.4<L>  /  Alb  2.9<L>  /  TBili  0.9  /  DBili  x   /  AST  20  /  ALT  11  /  AlkPhos  46  11-07                          9.8    5.61  )-----------( 315      ( 07 Nov 2021 06:12 )             29.6         CARDIAC ENZYMES  Creatine Kinase, Serum: 177 (11-06 @ 15:27)    CKMB Units: 2.4 (11-06 @ 15:27)    Troponin T, Serum: 1.88 ng/mL (11-06-21 @ 15:27)      MEDICATIONS  (STANDING):  aspirin  chewable 81 milliGRAM(s) Oral two times a day  atorvastatin 20 milliGRAM(s) Oral at bedtime  celecoxib 200 milliGRAM(s) Oral daily  chlorhexidine 4% Liquid 1 Application(s) Topical <User Schedule>  enoxaparin Injectable 40 milliGRAM(s) SubCutaneous daily  lisinopril 5 milliGRAM(s) Oral daily  metoprolol tartrate 12.5 milliGRAM(s) Oral two times a day  pantoprazole    Tablet 40 milliGRAM(s) Oral before breakfast  ranolazine 500 milliGRAM(s) Oral two times a day  senna 2 Tablet(s) Oral at bedtime    MEDICATIONS  (PRN):  nitroglycerin     SubLingual 0.4 milliGRAM(s) SubLingual every 5 minutes PRN Chest Pain  ondansetron Injectable 4 milliGRAM(s) IV Push every 6 hours PRN Nausea and/or Vomiting  traMADol 50 milliGRAM(s) Oral every 6 hours PRN Severe Pain (7 - 10)    
 Patient is comfortable in bed, no chest pain      T(F): 95.2 (11-04-21 @ 11:00), Max: 100.1 (11-03-21 @ 19:00)  HR: 78 (11-04-21 @ 10:40)  BP: 93/50 (11-04-21 @ 10:40)  RR: 16 (11-04-21 @ 11:00)  SpO2: 96% (11-04-21 @ 10:40) (91% - 98%)    PHYSICAL EXAM:  GENERAL: NAD  HEAD:  Atraumatic, Normocephalic  EYES: EOMI, PERRLA, conjunctiva and sclera clear  NERVOUS SYSTEM:  Alert & Oriented X3, no focal deficits   CHEST/LUNG: Clear to percussion bilaterally; No rales, rhonchi, wheezing, or rubs  HEART: Regular rate and rhythm; No murmurs, rubs, or gallops  ABDOMEN: Soft, Nontender, Nondistended; Bowel sounds present  EXTREMITIES:  2+ Peripheral Pulses, No clubbing, cyanosis, or edema    LABS  11-04    141  |  108  |  18  ----------------------------<  114<H>  4.4   |  25  |  0.7    Ca    8.1<L>      04 Nov 2021 06:02  Mg     2.0     11-04    TPro  4.5<L>  /  Alb  2.9<L>  /  TBili  1.0  /  DBili  x   /  AST  48<H>  /  ALT  10  /  AlkPhos  43  11-04                          9.8    8.17  )-----------( 211      ( 04 Nov 2021 06:02 )             29.7         CARDIAC ENZYMES  Creatine Kinase, Serum: 321 (11-02 @ 15:54)    CKMB Units: 16.0 (11-02 @ 15:54)  CKMB Units: 3.6 (11-01 @ 17:50)    Troponin T, Serum: 0.50 ng/mL (11-03-21 @ 05:59)  Troponin T, Serum: 0.23 ng/mL (11-02-21 @ 19:46)  Troponin T, Serum: 0.21 ng/mL (11-02-21 @ 07:24)  Troponin T, Serum: 0.15 ng/mL (11-01-21 @ 21:32)  Troponin T, Serum: <0.01 ng/mL (11-01-21 @ 17:50)    < from: 12 Lead ECG (11.04.21 @ 09:04) >    Diagnosis Line Normal sinus rhythm with sinus arrhythmia  Left axis deviation  Nonspecific ST and T wave abnormality    < end of copied text >    < from: TTE Echo Complete w/o Contrast w/ Doppler (11.02.21 @ 10:41) >    Summary:   1. Left ventricular ejection fraction, by visual estimation, is 55 to 60%.   2. Normal left atrial size.   3. Moderate mitral valve regurgitation.   4. Mild-moderate tricuspid regurgitation.   5. Estimated pulmonary artery systolic pressure is 37.0 mmHg assuming a right atrial pressure of 3 mmHg, which is consistent with borderline pulmonary hypertension.    < end of copied text >    RADIOLOGY  < from: Xray Pelvis AP only (11.01.21 @ 17:19) >  impression: Post interval resection of the femoral head and neck and placement of total hip arthroplasty in anatomic alignment on AP view with postsurgical changes as swelling and soft tissue air in the left thigh. There is moderate osteoarthritis of the right hip.    < end of copied text >    MEDICATIONS  (STANDING):  acetaminophen     Tablet .. 650 milliGRAM(s) Oral every 6 hours  aspirin  chewable 81 milliGRAM(s) Oral two times a day  atorvastatin 20 milliGRAM(s) Oral at bedtime  celecoxib 200 milliGRAM(s) Oral daily  chlorhexidine 4% Liquid 1 Application(s) Topical <User Schedule>  enoxaparin Injectable 40 milliGRAM(s) SubCutaneous daily  lisinopril 5 milliGRAM(s) Oral daily  metoprolol tartrate 12.5 milliGRAM(s) Oral two times a day  pantoprazole    Tablet 40 milliGRAM(s) Oral before breakfast  ranolazine 500 milliGRAM(s) Oral two times a day  senna 2 Tablet(s) Oral at bedtime    MEDICATIONS  (PRN):  nitroglycerin     SubLingual 0.4 milliGRAM(s) SubLingual every 5 minutes PRN Chest Pain  ondansetron Injectable 4 milliGRAM(s) IV Push every 6 hours PRN Nausea and/or Vomiting  traMADol 50 milliGRAM(s) Oral every 6 hours PRN Severe Pain (7 - 10)    
SUBJECTIVE:    Patient is a 89y old Female who presents with a chief complaint of fall (30 Oct 2021 01:06)  Currently admitted to medicine with the primary diagnosis of Hip fracture  Today is hospital day 1d. This morning she is resting comfortably in bed and reports no new issues or overnight events.   this morning endorses pain is well controlled.     PAST MEDICAL & SURGICAL HISTORY  HTN, age [age]      SOCIAL HISTORY:  Negative for smoking/alcohol/drug use.     ALLERGIES:  NKDA (Unknown)  Shrimp (Rash)    MEDICATIONS:  STANDING MEDICATIONS  heparin   Injectable 5000 Unit(s) SubCutaneous every 12 hours  lisinopril 5 milliGRAM(s) Oral daily  polyethylene glycol 3350 17 Gram(s) Oral daily  senna 2 Tablet(s) Oral at bedtime    PRN MEDICATIONS  acetaminophen     Tablet .. 1000 milliGRAM(s) Oral every 6 hours PRN  morphine  - Injectable 2 milliGRAM(s) IV Push every 6 hours PRN  oxycodone    5 mG/acetaminophen 325 mG 1 Tablet(s) Oral every 6 hours PRN  traMADol 50 milliGRAM(s) Oral every 6 hours PRN    VITALS:   T(F): 100.1  HR: 87  BP: 120/55  RR: 15  SpO2: 97%    LABS:                        11.5   11.32 )-----------( 200      ( 30 Oct 2021 07:25 )             34.8     10-30    136  |  102  |  13  ----------------------------<  158<H>  4.0   |  23  |  0.7    Ca    8.9      30 Oct 2021 07:25  Phos  3.0     10-30  Mg     1.9     10-30    TPro  5.9<L>  /  Alb  3.9  /  TBili  0.9  /  DBili  x   /  AST  17  /  ALT  10  /  AlkPhos  48  10-30    PT/INR - ( 30 Oct 2021 07:25 )   PT: 12.40 sec;   INR: 1.08 ratio         PTT - ( 30 Oct 2021 07:25 )  PTT:29.0 sec  Urinalysis Basic - ( 29 Oct 2021 20:01 )    Color: Yellow / Appearance: Slightly Turbid / S.023 / pH: x  Gluc: x / Ketone: Small  / Bili: Negative / Urobili: <2 mg/dL   Blood: x / Protein: 30 mg/dL / Nitrite: Negative   Leuk Esterase: Large / RBC: 3 /HPF / WBC 83 /HPF   Sq Epi: x / Non Sq Epi: 7 /HPF / Bacteria: Few        Lactate, Blood: 1.9 mmol/L (10-29-21 @ 19:57)      RADIOLOGY:    x< from: Xray Hip 2-3 Views, Left (10.29.21 @ 21:23) >    FINDINGS:    Acute, displaced left femoral neck fracture. Osteopenia. Degenerative change of bilateral hips and lower lumbar spine.    IMPRESSION:  Acute, displaced left femoral neck fracture.    < end of copied text >      PHYSICAL EXAM:  GEN: No acute distress  LUNGS: Clear to auscultation bilaterally   HEART: S1/S2 present. RRR.   ABD: Soft, non-tender, non-distended. Bowel sounds present  EXT: left sided lower ext tenderness on palpation, limited ROM   NEURO: AAOX3    
 89y old Female who presents with a chief complaint of fall , admitted to medicine with the primary diagnosis of Hip fracture after mechanical fall, pt was originally admitted to [place], consulted by ortho and transferred to [facility] for surgery, will keep NPO after midnight for OR tomorrow.   Today pt feels OK, very pleasant and cooperative, not in pain at rest.      PAST MEDICAL & SURGICAL HISTORY  HTN, age [age]      SOCIAL HISTORY:  Negative for smoking/alcohol/drug use.     ALLERGIES:  NKDA (Unknown)  Shrimp (Rash)      VITALS:   T(C): 37.4 (31 Oct 2021 13:37), Max: 38.1 (30 Oct 2021 18:24)  T(F): 99.4 (31 Oct 2021 13:37), Max: 100.6 (30 Oct 2021 18:24)  HR: 95 (31 Oct 2021 13:37) (85 - 95)  BP: 130/59 (31 Oct 2021 13:37) (114/56 - 130/59)  BP(mean): --  RR: 16 (31 Oct 2021 13:37) (16 - 16)  SpO2: 96% (30 Oct 2021 20:33) (96% - 96%)    PHYSICAL EXAM:  GEN: No acute distress  LUNGS: Clear to auscultation bilaterally   HEART: S1/S2 present. RRR.   ABD: Soft, non-tender, non-distended. Bowel sounds present  EXT: left sided lower ext tenderness on palpation, limited ROM due to pain   NEURO: AAOX3, pt has no focal motor or sensory deficit     LABS:                                   11.5   11.32 )-----------( 200      ( 30 Oct 2021 07:25 )             34.8   10-30    136  |  102  |  13  ----------------------------<  158<H>  4.0   |  23  |  0.7    Ca    8.9      30 Oct 2021 07:25  Phos  3.0     10-30  Mg     1.9     10-30    TPro  5.9<L>  /  Alb  3.9  /  TBili  0.9  /  DBili  x   /  AST  17  /  ALT  10  /  AlkPhos  48  10-30         PTT - ( 30 Oct 2021 07:25 )  PTT:29.0 sec  Urinalysis Basic - ( 29 Oct 2021 20:01 )    Color: Yellow / Appearance: Slightly Turbid / S.023 / pH: x  Gluc: x / Ketone: Small  / Bili: Negative / Urobili: <2 mg/dL   Blood: x / Protein: 30 mg/dL / Nitrite: Negative   Leuk Esterase: Large / RBC: 3 /HPF / WBC 83 /HPF   Sq Epi: x / Non Sq Epi: 7 /HPF / Bacteria: Few  Lactate, Blood: 1.9 mmol/L (10-29-21 @ 19:57)      RADIOLOGY:     < from: Xray Knee 1 or 2 Views, Left (10.30.21 @ 08:33) >    Findings/  impression: No acute fracture or dislocation.    < end of copied text >  < from: CT Pelvis No Cont (10.29.21 @ 22:31) >  IMPRESSION:    Acute, minimally displaced subcapital fracture of the left femoral neck .    Left gluteal intramuscular hematoma, difficult to measure.    MEDICATIONS  (STANDING):  heparin   Injectable 5000 Unit(s) SubCutaneous every 12 hours  lisinopril 5 milliGRAM(s) Oral daily  polyethylene glycol 3350 17 Gram(s) Oral daily  senna 2 Tablet(s) Oral at bedtime    MEDICATIONS  (PRN):  morphine  - Injectable 2 milliGRAM(s) IV Push every 6 hours PRN Severe Pain (7 - 10)  oxycodone    5 mG/acetaminophen 325 mG 1 Tablet(s) Oral every 6 hours PRN Moderate Pain (4 - 6)  traMADol 50 milliGRAM(s) Oral every 6 hours PRN Severe Pain (7 - 10)

## 2021-11-08 NOTE — PROGRESS NOTE ADULT - PROVIDER SPECIALTY LIST ADULT
Cardiology
Hospitalist
CCU
CCU
Hospitalist
Hospitalist
Orthopedics
Orthopedics
CCU
Cardiology
Hospitalist
Hospitalist
Internal Medicine
Orthopedics
Orthopedics
Physiatry
Cardiology
Cardiology
Hospitalist

## 2021-11-08 NOTE — PROGRESS NOTE ADULT - REASON FOR ADMISSION
fall

## 2021-11-08 NOTE — DISCHARGE NOTE NURSING/CASE MANAGEMENT/SOCIAL WORK - PATIENT PORTAL LINK FT
You can access the FollowMyHealth Patient Portal offered by [facility] by registering at the following website: [email or web address]. By joining [facility]’s FollowMyHealth portal, you will also be able to view your health information using other applications (apps) compatible with our system.

## 2021-11-15 DIAGNOSIS — I95.1 ORTHOSTATIC HYPOTENSION: ICD-10-CM
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
## 2021-11-15 DIAGNOSIS — S72.012A UNSPECIFIED INTRACAPSULAR FRACTURE OF LEFT FEMUR, INITIAL ENCOUNTER FOR CLOSED FRACTURE: ICD-10-CM

## 2021-11-15 DIAGNOSIS — B96.89 OTHER SPECIFIED BACTERIAL AGENTS AS THE CAUSE OF DISEASES CLASSIFIED ELSEWHERE: ICD-10-CM

## 2021-11-15 DIAGNOSIS — S30.0XXA CONTUSION OF LOWER BACK AND PELVIS, INITIAL ENCOUNTER: ICD-10-CM

## 2021-11-15 DIAGNOSIS — I27.20 PULMONARY HYPERTENSION, UNSPECIFIED: ICD-10-CM

## 2021-11-15 DIAGNOSIS — I10 ESSENTIAL (PRIMARY) HYPERTENSION: ICD-10-CM

## 2021-11-15 DIAGNOSIS — D62 ACUTE POSTHEMORRHAGIC ANEMIA: ICD-10-CM

## 2021-11-15 DIAGNOSIS — Z79.82 LONG TERM (CURRENT) USE OF ASPIRIN: ICD-10-CM
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
## 2021-11-15 DIAGNOSIS — I21.4 NON-ST ELEVATION (NSTEMI) MYOCARDIAL INFARCTION: ICD-10-CM

## 2021-11-15 DIAGNOSIS — R63.6 UNDERWEIGHT: ICD-10-CM

## 2022-08-01 NOTE — CONSULT NOTE ADULT - ATTENDING SUPERVISION STATEMENT
See encounter from 8/1/22  
See result note.  UTI is not sensitive to bactrim.  Please have her stop it and take fosfomycin 3 grams po x 1.    
Resident
ACP

## 2023-12-06 ENCOUNTER — APPOINTMENT (OUTPATIENT)
Dept: OTOLARYNGOLOGY | Facility: CLINIC | Age: 88
End: 2023-12-06
Payer: MEDICARE

## 2023-12-06 DIAGNOSIS — H90.5 UNSPECIFIED SENSORINEURAL HEARING LOSS: ICD-10-CM

## 2023-12-06 DIAGNOSIS — H91.90 UNSPECIFIED HEARING LOSS, UNSPECIFIED EAR: ICD-10-CM

## 2023-12-06 PROBLEM — Z00.00 ENCOUNTER FOR PREVENTIVE HEALTH EXAMINATION: Status: ACTIVE | Noted: 2023-12-06

## 2023-12-06 PROCEDURE — 92550 TYMPANOMETRY & REFLEX THRESH: CPT | Mod: 52

## 2023-12-06 PROCEDURE — 99204 OFFICE O/P NEW MOD 45 MIN: CPT

## 2023-12-06 PROCEDURE — 92557 COMPREHENSIVE HEARING TEST: CPT

## 2023-12-27 ENCOUNTER — APPOINTMENT (OUTPATIENT)
Dept: OTOLARYNGOLOGY | Facility: CLINIC | Age: 88
End: 2023-12-27
Payer: MEDICARE

## 2023-12-27 PROCEDURE — V5299A: CUSTOM

## 2024-01-22 ENCOUNTER — APPOINTMENT (OUTPATIENT)
Dept: OTOLARYNGOLOGY | Facility: CLINIC | Age: 89
End: 2024-01-22
Payer: MEDICARE

## 2024-01-22 PROCEDURE — V5261F: CUSTOM | Mod: GY

## 2024-02-12 ENCOUNTER — APPOINTMENT (OUTPATIENT)
Dept: OTOLARYNGOLOGY | Facility: CLINIC | Age: 89
End: 2024-02-12
Payer: MEDICARE

## 2024-02-12 PROCEDURE — V5299A: CUSTOM

## 2024-04-01 ENCOUNTER — APPOINTMENT (OUTPATIENT)
Dept: OTOLARYNGOLOGY | Facility: CLINIC | Age: 89
End: 2024-04-01
Payer: SELF-PAY

## 2024-04-01 PROCEDURE — V5299A: CUSTOM

## 2024-04-15 ENCOUNTER — INPATIENT (INPATIENT)
Facility: HOSPITAL | Age: 89
LOS: 3 days | Discharge: HOME | DRG: 536 | End: 2024-04-19
Attending: INTERNAL MEDICINE | Admitting: HOSPITALIST
Payer: MEDICARE

## 2024-04-15 VITALS
RESPIRATION RATE: 18 BRPM | TEMPERATURE: 97 F | WEIGHT: 100.09 LBS | DIASTOLIC BLOOD PRESSURE: 86 MMHG | OXYGEN SATURATION: 96 % | HEART RATE: 78 BPM | HEIGHT: 64 IN | SYSTOLIC BLOOD PRESSURE: 122 MMHG

## 2024-04-15 DIAGNOSIS — S72.009A FRACTURE OF UNSPECIFIED PART OF NECK OF UNSPECIFIED FEMUR, INITIAL ENCOUNTER FOR CLOSED FRACTURE: ICD-10-CM

## 2024-04-15 DIAGNOSIS — Z96.642 PRESENCE OF LEFT ARTIFICIAL HIP JOINT: Chronic | ICD-10-CM

## 2024-04-15 LAB
ALBUMIN SERPL ELPH-MCNC: 4.8 G/DL — SIGNIFICANT CHANGE UP (ref 3.5–5.2)
ALP SERPL-CCNC: 62 U/L — SIGNIFICANT CHANGE UP (ref 30–115)
ALT FLD-CCNC: 11 U/L — SIGNIFICANT CHANGE UP (ref 0–41)
ANION GAP SERPL CALC-SCNC: 13 MMOL/L — SIGNIFICANT CHANGE UP (ref 7–14)
APPEARANCE UR: CLEAR — SIGNIFICANT CHANGE UP
APTT BLD: 34.8 SEC — SIGNIFICANT CHANGE UP (ref 27–39.2)
AST SERPL-CCNC: 19 U/L — SIGNIFICANT CHANGE UP (ref 0–41)
BACTERIA # UR AUTO: ABNORMAL /HPF
BASOPHILS # BLD AUTO: 0.02 K/UL — SIGNIFICANT CHANGE UP (ref 0–0.2)
BASOPHILS NFR BLD AUTO: 0.2 % — SIGNIFICANT CHANGE UP (ref 0–1)
BILIRUB SERPL-MCNC: 0.9 MG/DL — SIGNIFICANT CHANGE UP (ref 0.2–1.2)
BILIRUB UR-MCNC: NEGATIVE — SIGNIFICANT CHANGE UP
BUN SERPL-MCNC: 15 MG/DL — SIGNIFICANT CHANGE UP (ref 10–20)
CALCIUM SERPL-MCNC: 9.5 MG/DL — SIGNIFICANT CHANGE UP (ref 8.4–10.5)
CAST: 0 /LPF — SIGNIFICANT CHANGE UP (ref 0–4)
CHLORIDE SERPL-SCNC: 103 MMOL/L — SIGNIFICANT CHANGE UP (ref 98–110)
CO2 SERPL-SCNC: 25 MMOL/L — SIGNIFICANT CHANGE UP (ref 17–32)
COLOR SPEC: YELLOW — SIGNIFICANT CHANGE UP
CREAT SERPL-MCNC: 0.8 MG/DL — SIGNIFICANT CHANGE UP (ref 0.7–1.5)
DIFF PNL FLD: NEGATIVE — SIGNIFICANT CHANGE UP
EGFR: 69 ML/MIN/1.73M2 — SIGNIFICANT CHANGE UP
EOSINOPHIL # BLD AUTO: 0.02 K/UL — SIGNIFICANT CHANGE UP (ref 0–0.7)
EOSINOPHIL NFR BLD AUTO: 0.2 % — SIGNIFICANT CHANGE UP (ref 0–8)
GLUCOSE SERPL-MCNC: 114 MG/DL — HIGH (ref 70–99)
GLUCOSE UR QL: NEGATIVE MG/DL — SIGNIFICANT CHANGE UP
HCT VFR BLD CALC: 44.4 % — SIGNIFICANT CHANGE UP (ref 37–47)
HGB BLD-MCNC: 14.8 G/DL — SIGNIFICANT CHANGE UP (ref 12–16)
IMM GRANULOCYTES NFR BLD AUTO: 0.4 % — HIGH (ref 0.1–0.3)
INR BLD: 1.01 RATIO — SIGNIFICANT CHANGE UP (ref 0.65–1.3)
KETONES UR-MCNC: ABNORMAL MG/DL
LEUKOCYTE ESTERASE UR-ACNC: ABNORMAL
LIDOCAIN IGE QN: 34 U/L — SIGNIFICANT CHANGE UP (ref 7–60)
LYMPHOCYTES # BLD AUTO: 0.67 K/UL — LOW (ref 1.2–3.4)
LYMPHOCYTES # BLD AUTO: 6.5 % — LOW (ref 20.5–51.1)
MCHC RBC-ENTMCNC: 30.3 PG — SIGNIFICANT CHANGE UP (ref 27–31)
MCHC RBC-ENTMCNC: 33.3 G/DL — SIGNIFICANT CHANGE UP (ref 32–37)
MCV RBC AUTO: 91 FL — SIGNIFICANT CHANGE UP (ref 81–99)
MONOCYTES # BLD AUTO: 0.23 K/UL — SIGNIFICANT CHANGE UP (ref 0.1–0.6)
MONOCYTES NFR BLD AUTO: 2.2 % — SIGNIFICANT CHANGE UP (ref 1.7–9.3)
NEUTROPHILS # BLD AUTO: 9.33 K/UL — HIGH (ref 1.4–6.5)
NEUTROPHILS NFR BLD AUTO: 90.5 % — HIGH (ref 42.2–75.2)
NITRITE UR-MCNC: NEGATIVE — SIGNIFICANT CHANGE UP
NRBC # BLD: 0 /100 WBCS — SIGNIFICANT CHANGE UP (ref 0–0)
PH UR: 7.5 — SIGNIFICANT CHANGE UP (ref 5–8)
PLATELET # BLD AUTO: 212 K/UL — SIGNIFICANT CHANGE UP (ref 130–400)
PMV BLD: 9.6 FL — SIGNIFICANT CHANGE UP (ref 7.4–10.4)
POTASSIUM SERPL-MCNC: 4.3 MMOL/L — SIGNIFICANT CHANGE UP (ref 3.5–5)
POTASSIUM SERPL-SCNC: 4.3 MMOL/L — SIGNIFICANT CHANGE UP (ref 3.5–5)
PROT SERPL-MCNC: 7.2 G/DL — SIGNIFICANT CHANGE UP (ref 6–8)
PROT UR-MCNC: SIGNIFICANT CHANGE UP MG/DL
PROTHROM AB SERPL-ACNC: 11.5 SEC — SIGNIFICANT CHANGE UP (ref 9.95–12.87)
RBC # BLD: 4.88 M/UL — SIGNIFICANT CHANGE UP (ref 4.2–5.4)
RBC # FLD: 13.5 % — SIGNIFICANT CHANGE UP (ref 11.5–14.5)
RBC CASTS # UR COMP ASSIST: 2 /HPF — SIGNIFICANT CHANGE UP (ref 0–4)
SODIUM SERPL-SCNC: 141 MMOL/L — SIGNIFICANT CHANGE UP (ref 135–146)
SP GR SPEC: 1.01 — SIGNIFICANT CHANGE UP (ref 1–1.03)
SQUAMOUS # UR AUTO: 3 /HPF — SIGNIFICANT CHANGE UP (ref 0–5)
UROBILINOGEN FLD QL: 0.2 MG/DL — SIGNIFICANT CHANGE UP (ref 0.2–1)
WBC # BLD: 10.31 K/UL — SIGNIFICANT CHANGE UP (ref 4.8–10.8)
WBC # FLD AUTO: 10.31 K/UL — SIGNIFICANT CHANGE UP (ref 4.8–10.8)
WBC UR QL: 3 /HPF — SIGNIFICANT CHANGE UP (ref 0–5)

## 2024-04-15 PROCEDURE — 72125 CT NECK SPINE W/O DYE: CPT | Mod: 26,MC

## 2024-04-15 PROCEDURE — 97116 GAIT TRAINING THERAPY: CPT | Mod: GP

## 2024-04-15 PROCEDURE — C1713: CPT

## 2024-04-15 PROCEDURE — C1889: CPT

## 2024-04-15 PROCEDURE — 71250 CT THORAX DX C-: CPT | Mod: MC

## 2024-04-15 PROCEDURE — 71045 X-RAY EXAM CHEST 1 VIEW: CPT

## 2024-04-15 PROCEDURE — 99285 EMERGENCY DEPT VISIT HI MDM: CPT | Mod: GC

## 2024-04-15 PROCEDURE — 70450 CT HEAD/BRAIN W/O DYE: CPT | Mod: 26,MC

## 2024-04-15 PROCEDURE — 86901 BLOOD TYPING SEROLOGIC RH(D): CPT

## 2024-04-15 PROCEDURE — 73552 X-RAY EXAM OF FEMUR 2/>: CPT | Mod: 26,RT

## 2024-04-15 PROCEDURE — 82306 VITAMIN D 25 HYDROXY: CPT

## 2024-04-15 PROCEDURE — 85027 COMPLETE CBC AUTOMATED: CPT

## 2024-04-15 PROCEDURE — 93005 ELECTROCARDIOGRAM TRACING: CPT

## 2024-04-15 PROCEDURE — 71045 X-RAY EXAM CHEST 1 VIEW: CPT | Mod: 26

## 2024-04-15 PROCEDURE — 83735 ASSAY OF MAGNESIUM: CPT

## 2024-04-15 PROCEDURE — 88305 TISSUE EXAM BY PATHOLOGIST: CPT

## 2024-04-15 PROCEDURE — 72192 CT PELVIS W/O DYE: CPT | Mod: 26

## 2024-04-15 PROCEDURE — 97165 OT EVAL LOW COMPLEX 30 MIN: CPT | Mod: GO

## 2024-04-15 PROCEDURE — 97162 PT EVAL MOD COMPLEX 30 MIN: CPT | Mod: GP

## 2024-04-15 PROCEDURE — 80053 COMPREHEN METABOLIC PANEL: CPT

## 2024-04-15 PROCEDURE — 86850 RBC ANTIBODY SCREEN: CPT

## 2024-04-15 PROCEDURE — 73560 X-RAY EXAM OF KNEE 1 OR 2: CPT | Mod: RT

## 2024-04-15 PROCEDURE — 72192 CT PELVIS W/O DYE: CPT | Mod: MC

## 2024-04-15 PROCEDURE — 86900 BLOOD TYPING SEROLOGIC ABO: CPT

## 2024-04-15 PROCEDURE — 80048 BASIC METABOLIC PNL TOTAL CA: CPT

## 2024-04-15 PROCEDURE — 85025 COMPLETE CBC W/AUTO DIFF WBC: CPT

## 2024-04-15 PROCEDURE — C1776: CPT

## 2024-04-15 PROCEDURE — 36415 COLL VENOUS BLD VENIPUNCTURE: CPT

## 2024-04-15 PROCEDURE — 83880 ASSAY OF NATRIURETIC PEPTIDE: CPT

## 2024-04-15 PROCEDURE — 88311 DECALCIFY TISSUE: CPT

## 2024-04-15 PROCEDURE — 73502 X-RAY EXAM HIP UNI 2-3 VIEWS: CPT | Mod: 26,RT

## 2024-04-15 PROCEDURE — 73560 X-RAY EXAM OF KNEE 1 OR 2: CPT | Mod: 26,RT

## 2024-04-15 PROCEDURE — 99222 1ST HOSP IP/OBS MODERATE 55: CPT

## 2024-04-15 PROCEDURE — 73501 X-RAY EXAM HIP UNI 1 VIEW: CPT | Mod: RT

## 2024-04-15 PROCEDURE — 97110 THERAPEUTIC EXERCISES: CPT | Mod: GP

## 2024-04-15 RX ORDER — LISINOPRIL 2.5 MG/1
5 TABLET ORAL DAILY
Refills: 0 | Status: DISCONTINUED | OUTPATIENT
Start: 2024-04-15 | End: 2024-04-16

## 2024-04-15 RX ORDER — ENOXAPARIN SODIUM 100 MG/ML
40 INJECTION SUBCUTANEOUS ONCE
Refills: 0 | Status: DISCONTINUED | OUTPATIENT
Start: 2024-04-15 | End: 2024-04-16

## 2024-04-15 RX ORDER — ACETAMINOPHEN 500 MG
650 TABLET ORAL EVERY 6 HOURS
Refills: 0 | Status: DISCONTINUED | OUTPATIENT
Start: 2024-04-15 | End: 2024-04-16

## 2024-04-15 RX ORDER — LISINOPRIL 2.5 MG/1
1 TABLET ORAL
Qty: 0 | Refills: 0 | DISCHARGE

## 2024-04-15 RX ORDER — METOPROLOL TARTRATE 50 MG
1 TABLET ORAL
Refills: 0 | DISCHARGE

## 2024-04-15 RX ORDER — METOPROLOL TARTRATE 50 MG
25 TABLET ORAL
Refills: 0 | Status: DISCONTINUED | OUTPATIENT
Start: 2024-04-15 | End: 2024-04-16

## 2024-04-15 RX ORDER — ACETAMINOPHEN 500 MG
650 TABLET ORAL ONCE
Refills: 0 | Status: COMPLETED | OUTPATIENT
Start: 2024-04-15 | End: 2024-04-15

## 2024-04-15 RX ORDER — CHLORHEXIDINE GLUCONATE 213 G/1000ML
1 SOLUTION TOPICAL
Refills: 0 | Status: DISCONTINUED | OUTPATIENT
Start: 2024-04-15 | End: 2024-04-16

## 2024-04-15 RX ADMIN — Medication 650 MILLIGRAM(S): at 13:46

## 2024-04-15 NOTE — ED ADULT NURSE NOTE - NS PRO PASSIVE SMOKE EXP
aox3 breathing even and unlabored c.o of period cramps. is percribed naproxen for pain but has not has any relief Unknown

## 2024-04-15 NOTE — H&P ADULT - HISTORY OF PRESENT ILLNESS
89yo F w/ pmhx of HTN and L femoral neck fracture 2021 s/p MARIA VICTORIA with Dr. Matamoros presents to ED for mechanical fall and R hip fracture. Patient was attempting [place] sit down at park and fell. Denies syncope or any other prodromal symptoms as reason for fall. No  HT or LOC. Only reports R hip pain mainly when she moves her leg. Does not currently use any assistive device while walking and is proficient with ADLs.     ED course:  vitals: afebrile, HR 78, /86, O2 normal RA  labs: unremarkable  imaging:   - CT C-spine: no fracture  - CT head: no acute pathology  - CT pelvis: Acute subcapital fracture of the right femoral neck. Increased size of infrarenal abdominal aortic aneurysm measuring up to 4.3 cm.

## 2024-04-15 NOTE — H&P ADULT - ASSESSMENT
89yo F w/ pmhx of HTN and L femoral neck fracture 2021 s/p MARIA VICTORIA with Dr. Matamoros presents to ED for mechanical fall and R hip fracture. Admitted for R hip fracture and plan is for transfer south for surgery by orthopedics tomorrow 4/16.     #R hip fracture  - CT pelvis: Acute subcapital fracture of the right femoral neck.  - s/p ortho eval in ED: Add on for R hip CRPP vs james vs total arthroplasty tomorrow 4/16/24; transfer to [facility]   - PT/OT/physiatry after surgery  - medical risk stratification:      - RCRI 0, class I risk, 3.9% risk for perioperative MACE; no need to assess METS     - CXR normal, no need for ECG     - moderate risk patient for moderate risk surgery   - signed out to Dr. Hai Vela    #AAA  - CT pelvis: Increased size of infrarenal abdominal aortic aneurysm measuring up to 4.3 cm.   - f/u o/p    DVT ppx: lovenox and SCDs  Dispo: from home, walks independently; ortho surgery tomorrow 4/16, transfer to [facility], PT/OT/physiatry   89yo F w/ pmhx of HTN and L femoral neck fracture 2021 s/p MARIA VICTORIA with Dr. Matamoros presents to ED for mechanical fall and R hip fracture. Admitted for R hip fracture and plan is for transfer south for surgery by orthopedics tomorrow 4/16.     #R hip fracture  - CT pelvis: Acute subcapital fracture of the right femoral neck.  - s/p ortho eval in ED: Add on for R hip CRPP vs james vs total arthroplasty tomorrow 4/16/24; transfer to [facility]   - PT/OT/physiatry after surgery  - medical risk stratification:      - RCRI 0, class I risk, 3.9% risk for perioperative MACE; no need to assess METS     - CXR normal, no need for ECG     - moderate risk patient for moderate risk surgery   - signed out to Dr. Hai Vela  - spoke to granddaughter at bedside     #AAA  - CT pelvis: Increased size of infrarenal abdominal aortic aneurysm measuring up to 4.3 cm.   - f/u o/p    DVT ppx: lovenox and SCDs  Dispo: from home, walks independently; ortho surgery tomorrow 4/16, transfer to [facility], PT/OT/physiatry 89yo F w/ pmhx of HTN and L femoral neck fracture 2021 s/p MARIA VICTORIA with Dr. Matamoros presents to ED for mechanical fall and R hip fracture. Admitted for R hip fracture and plan is for transfer south for surgery by orthopedics tomorrow 4/16.     #R hip fracture  - CT pelvis: Acute subcapital fracture of the right femoral neck.  - s/p ortho eval in ED: Add on for R hip CRPP vs james vs total arthroplasty tomorrow 4/16/24; transfer to [facility]   - PT/OT/physiatry after surgery  - medical risk stratification:      - RCRI 0, class I risk, 3.9% risk for perioperative MACE; no need to assess METS     - CXR normal, no need for ECG     - moderate risk patient for moderate risk surgery   - signed out to Dr. Hai Vela  - spoke to granddaughter at bedside     #AAA  #HTN  - CT pelvis: Increased size of infrarenal abdominal aortic aneurysm measuring up to 4.3 cm; f/u o/p  - c/w lisinopril 5mg qd and metoprolol 25mg bid     DVT ppx: lovenox and SCDs  Dispo: from home, walks independently; ortho surgery tomorrow 4/16, transfer to [facility], PT/OT/physiatry 89yo F w/ pmhx of HTN and L femoral neck fracture 2021 s/p MARIA VICTORIA with Dr. Matamoros presents to ED for mechanical fall and R hip fracture. Admitted for R hip fracture and plan is for transfer south for surgery by orthopedics tomorrow 4/16.     #R hip fracture  - CT pelvis: Acute subcapital fracture of the right femoral neck.  - s/p ortho eval in ED: Add on for R hip CRPP vs james vs total arthroplasty tomorrow 4/16/24; transfer to [facility]   - PT/OT/physiatry after surgery  - medical risk stratification:      - RCRI 0, class I risk, 3.9% risk for perioperative MACE; no need to assess METS     - CXR normal, no need for ECG     - moderate risk patient for moderate risk surgery   - signed out to Dr. Hai Vela  - spoke to granddaughter at bedside     #AAA  #HTN  - CT pelvis: Increased size of infrarenal abdominal aortic aneurysm measuring up to 4.3 cm; f/u o/p  - c/w lisinopril 5mg qd and metoprolol tartrate 25mg bid     DVT ppx: lovenox and SCDs  Dispo: from home, walks independently; ortho surgery tomorrow 4/16, transfer to [facility], PT/OT/physiatry

## 2024-04-15 NOTE — ED PROVIDER NOTE - DIFFERENTIAL DIAGNOSIS
Differential Diagnosis The differential diagnosis for patients clinical presentation includes but is not limited to:  hip fracture, dislocation, muscle strain, contusion

## 2024-04-15 NOTE — ED PROVIDER NOTE - OBJECTIVE STATEMENT
Pt is a 89 y.o. F with  PMHx of HTN, L femoral neck fracture 2021 s/p MARIA VICTORIA with Dr. Matamoros who presents to ED s/[p mechanical fall. Patient states that she was attempting to sit down at park and fell straight down onto her buttock. Denies any head trauma, LOC, or AC use. Pt denies any syncope or any other prodromal symptoms as reason for fall. Pt states she was immediately lifted up off the ground  with the help of a good Orthodox. Pt states she could not walk though and EMS was called. Pt states she usually is able to ambulate at baseline without assistance. Pt only reports right hip pain mainly when she moves her leg.

## 2024-04-15 NOTE — ED ADULT NURSE NOTE - NSFALLUNIVINTERV_ED_ALL_ED
Bed/Stretcher in lowest position, wheels locked, appropriate side rails in place/Call bell, personal items and telephone in reach/Instruct patient to call for assistance before getting out of bed/chair/stretcher/Non-slip footwear applied when patient is off stretcher/Phil [place] to call system/Physically safe environment - no spills, clutter or unnecessary equipment/Purposeful proactive rounding/Room/bathroom lighting operational, light cord in reach

## 2024-04-15 NOTE — H&P ADULT - NSHPPHYSICALEXAM_GEN_ALL_CORE
PHYSICAL EXAM:  GENERAL: NAD, lying in bed comfortably  HEAD:  Atraumatic, Normocephalic  EYES: EOMI, PERRLA, conjunctiva and sclera clear  ENT: Moist mucous membranes  NECK: Supple, No JVD  CHEST/LUNG: Clear to auscultation bilaterally; No rales, rhonchi, wheezing, or rubs. Unlabored respirations  HEART: Regular rate and rhythm; No murmurs, rubs, or gallops  ABDOMEN: Bowel sounds present; Soft, Nontender, Nondistended. No hepatomegally  EXTREMITIES:  2+ Peripheral Pulses, brisk capillary refill. No clubbing, cyanosis, or edema  NERVOUS SYSTEM:  Alert & Oriented X3, speech clear. No deficits   MSK: Decreased ROM R hip   SKIN: No rashes or lesions

## 2024-04-15 NOTE — ED PROVIDER NOTE - ATTENDING CONTRIBUTION TO CARE
I personally evaluated patient. I agree with the findings and plan with all documentation on chart except as documented  in my note.    89-year-old female past medical history of hypertension, prior left hip fracture presents to the emergency department status post mechanical fall today.  Patient was attempting to sit at the park and fell on her right buttocks and hurt her right hip.  Patient did not hit her head or lose consciousness.  Patient small skin tear/abrasion to her right forearm but denies any other pain or injuries.  Patient was otherwise in normal state of health and denies any recent fever or illness, numbness, weakness, tingling.    On exam, vital signs reviewed.  Patient well-appearing and GCS 15.  Primary survey is intact.  Secondary survey positive for small skin tear to right forearm and tenderness to right hip and inner thigh.  Trauma workup performed and shows right hip fracture.  Orthopedics consulted for care.  Will follow-up orthopedic recommendations and patient will require admission for operative intervention.

## 2024-04-15 NOTE — ED PROVIDER NOTE - NSCAREINITIATED _GEN_ER
MD Patricio [facility]ana [facility] Nurse Msg Pool 3 hours ago (11:56 AM)     Please direct to travel clinic.  Thanks        
Pt called, states she's going on vacation to [facility], and were told they should get prescribed malaria pills to bring with her, would like a call back.    Pt is in [facility] right now so will have to give you the correct pharmacy information, didn't have it available at time of call.    [phone] (M)  
Writer notified patient of MD recommendations.  Patient verbalizes understanding, states [place] to [facility] but states that her  is working with a travel group for review and discussion and possible medication/vaccination so she will go this route as well.     Patient has no additional questions or concerns at this time.     Advised to contact office with any further questions or concerns.         
Nate Magallanes(Resident)

## 2024-04-15 NOTE — ED PROVIDER NOTE - PHYSICAL EXAMINATION
VITAL SIGNS: noted  CONSTITUTIONAL: Well-developed; well-nourished; in no acute distress  HEAD: Normocephalic; atraumatic  EYES: PERRL, EOM intact; conjunctiva and sclera clear  ENT: No nasal discharge; MMM, oropharynx clear   NECK: Supple; non tender. No anterior cervical lymphadenopathy noted FROM no midline ttp  CARD: S1, S2 normal; no murmurs, gallops, or rubs. Regular rate and rhythm  RESP: CTAB/L, no wheezes, rales or rhonchi  ABD: Normal bowel sounds; soft; non-distended; non-tender; no organomegaly. No CVA tenderness  BACK: no midline ttp , no step offs noted.   EXT: Normal ROM. No calf tenderness or edema. Distal pulses intact. sensation intact. RLE focused exam limited ROM due to pain. ttp to right hip. no obvious gideon deformitis. no broken skin noted. no overlying skin changes noted.   NEURO: Awake and alert, oriented. Grossly unremarkable. No focal deficits. gait differed.   SKIN: Skin exam is warm and dry, no acute rash

## 2024-04-15 NOTE — H&P ADULT - NSHPREVIEWOFSYSTEMS_GEN_ALL_CORE
CONSTITUTIONAL: No weakness, fevers or chills  EYES/ENT: No visual changes;  No vertigo or throat pain   NECK: No pain or stiffness  RESPIRATORY: No cough, wheezing, hemoptysis; No shortness of breath  CARDIOVASCULAR: No chest pain or palpitations  GASTROINTESTINAL: No abdominal or epigastric pain. No nausea, vomiting, or hematemesis; No diarrhea or constipation. No melena or hematochezia.  GENITOURINARY: No dysuria, frequency or hematuria  NEUROLOGICAL: No numbness or weakness  MSK: R hip pain  SKIN: No itching, rashes

## 2024-04-15 NOTE — ED ADULT NURSE NOTE - OBJECTIVE STATEMENT
Pt. presents to the ED s/p fall. Pt. states she was taking a walk in the park and slipped and fell. Pt. denies HT/LOC. Pt. denies AC use. Pt. c/o R hip pain. Pt. states she is unable to bear weight. Pt. states she pressed her life-alert button and was helped up.

## 2024-04-15 NOTE — ED ADULT TRIAGE NOTE - NS ED TRIAGE AVPU SCALE
Size Of Lesion In Cm: 1.3 Alert-The patient is alert, awake and responds to voice. The patient is oriented to time, place, and person. The triage nurse is able to obtain subjective information.

## 2024-04-15 NOTE — CONSULT NOTE ADULT - SUBJECTIVE AND OBJECTIVE BOX
ORTHOPAEDIC SURGERY CONSULT NOTE    Reason for Consult: R femoral neck fracture    HPI: 89yFemale with history of HTN who presents for R hip pain after mechanical fall earlier today. Patient reports she was mechanical fall while going to sit down on a park bench. Immediately noted R hip pain, inability to ambulate. No head trauma or LOC. Denies pain elsewhere in BUE, LLE. No associated numbness/ tingling distally.     Patient known to our service for previous L femoral neck fracture, s/p L primary MARIA VICTORIA 11/1/2021 with Dr. Sarah John. Patient states after surgery, she briefly used cane/ walker, but baseline now is no assistive device. She lives alone in an apartment, elevator building, and is proficient in her ADLs. She is described as active -- enjoys walks and running errands. She denies any antecedent R hip pain.     PAST MEDICAL & SURGICAL HISTORY:  HTN    Allergies: Shrimp (Rash)  NKDA (Unknown)    Medications:     PHYSICAL EXAM:  Vital Signs Last 24 Hrs  T(C): 36.1 (15 Apr 2024 13:03), Max: 36.1 (15 Apr 2024 13:03)  T(F): 97 (15 Apr 2024 13:03), Max: 97 (15 Apr 2024 13:03)  HR: 78 (15 Apr 2024 13:03) (78 - 78)  BP: 122/86 (15 Apr 2024 13:03) (122/86 - 122/86)  BP(mean): --  RR: 18 (15 Apr 2024 13:03) (18 - 18)  SpO2: 96% (15 Apr 2024 13:03) (96% - 96%)    Parameters below as of 15 Apr 2024 13:03  Patient On (Oxygen Delivery Method): room air    Physical exam:  Awake, alert  Non-labored breathing    BUE  Skin tear R forearm  diffuse ecchymosis BUE  No swelling/erythema noted  No TTP, stepoff, deformity   ROM grossly intact in all joints, all planes  Motor/ sensation grossly intact  WWP distally    RLE  Skin intact  (+) deformity R hip, shortened RLE  ROM limited 2/2 pain  TTP R hip  NTTP R knee, tib/fib, ankle, foot  Compartments soft and compressible, no pain w/ passive stretch of digits  SILT SP/DP/T/Sural/Saph  Firing TA/EHL/FHL/GS  DP pulse 2+, cap refill <2    LLE  Anterior hip incision well healed  No swelling/erythema/ecchymosis noted  No TTP, stepoff, deformity   Negative log roll, axial load  ROM grossly intact in all joints, all planes  Motor/ sensation grossly intact  WWP distally    Labs:                        14.8   10.31 )-----------( 212      ( 15 Apr 2024 15:49 )             44.4     04-15    141  |  103  |  15  ----------------------------<  114<H>  4.3   |  25  |  0.8    Ca    9.5      15 Apr 2024 15:49    TPro  7.2  /  Alb  4.8  /  TBili  0.9  /  DBili  x   /  AST  19  /  ALT  11  /  AlkPhos  62  04-15    PT/INR - ( 15 Apr 2024 15:49 )   PT: 11.50 sec;   INR: 1.01 ratio       PTT - ( 15 Apr 2024 15:49 )  PTT:34.8 sec    Imaging:   Radiographs of pelvis, R hip, R femur demonstrate  -minimally displaced R subcapital femoral neck fracture  -s/p L MARIA VICTORIA with components in place, no signs of lucency or loosening   -mild degenerative changes    A/P: 89y Female with minimally displaced subcapital right femoral neck fracture. Nature of osseous injury discussed with patient, family. Recommendation for operative management to decrease pain, preserve functional status, early ambulation, and decrease morbidity/mortality associated with non-operative treatment. R/B/A of surgery vs non-operative management discussed. Patient/ family would like to proceed with surgery.     - Add on for R hip CRPP vs james vs total arthroplasty tomorrow 4/16/24  - NWB RLE  - Pain control per primary team  - DVT PPx per primary -- recommend dose of LVX/HSQ tonight. Hold tomorrow on morning of procedure  - Patient requires Internal Medicine pre-op clearance / risk stratification / medical optimization  - Pre-Op CBC, CMP/BMP, PT/PTT/INR, Type & Screen, CXR, EKG  - Trend Hgb  - 2u RBC on hold for surgery  - NPO for surgery tomorrow      ORTHOPAEDIC SURGERY CONSULT NOTE    Reason for Consult: R femoral neck fracture    HPI: 89yFemale with history of HTN who presents for R hip pain after mechanical fall earlier today. Patient describes mechanical fall while going to sit down on a park bench. Immediately noted R hip pain, inability to ambulate. No head trauma or LOC. Denies pain elsewhere in BUE, LLE. No associated numbness/ tingling distally.     Patient known to our service for previous L femoral neck fracture, s/p L primary MARIA VICTORIA 11/1/2021 with Dr. Sarah John. Patient states after surgery, she briefly used cane/ walker, but baseline now is no assistive device. She lives alone in an apartment, elevator building, and is proficient in ADLs. She is described as active -- enjoys walks and running errands. She denies any antecedent R hip pain.     PAST MEDICAL & SURGICAL HISTORY:  HTN    Allergies: Shrimp (Rash)  NKDA (Unknown)    Medications:     PHYSICAL EXAM:  Vital Signs Last 24 Hrs  T(C): 36.1 (15 Apr 2024 13:03), Max: 36.1 (15 Apr 2024 13:03)  T(F): 97 (15 Apr 2024 13:03), Max: 97 (15 Apr 2024 13:03)  HR: 78 (15 Apr 2024 13:03) (78 - 78)  BP: 122/86 (15 Apr 2024 13:03) (122/86 - 122/86)  BP(mean): --  RR: 18 (15 Apr 2024 13:03) (18 - 18)  SpO2: 96% (15 Apr 2024 13:03) (96% - 96%)    Parameters below as of 15 Apr 2024 13:03  Patient On (Oxygen Delivery Method): room air    Physical exam:  Awake, alert  Non-labored breathing    BUE  Skin tear R forearm  diffuse ecchymosis BUE  No swelling/erythema noted  No TTP, stepoff, deformity   ROM grossly intact in all joints, all planes  Motor/ sensation grossly intact  WWP distally    RLE  Skin intact  (+) deformity R hip, shortened RLE  ROM limited 2/2 pain  TTP R hip  NTTP R knee, tib/fib, ankle, foot  Compartments soft and compressible, no pain w/ passive stretch of digits  SILT SP/DP/T/Sural/Saph  Firing TA/EHL/FHL/GS  DP pulse 2+, cap refill <2    LLE  Anterior hip incision well healed  No swelling/erythema/ecchymosis noted  No TTP, stepoff, deformity   Negative log roll, axial load  ROM grossly intact in all joints, all planes  Motor/ sensation grossly intact  WWP distally    Labs:                        14.8   10.31 )-----------( 212      ( 15 Apr 2024 15:49 )             44.4     04-15    141  |  103  |  15  ----------------------------<  114<H>  4.3   |  25  |  0.8    Ca    9.5      15 Apr 2024 15:49    TPro  7.2  /  Alb  4.8  /  TBili  0.9  /  DBili  x   /  AST  19  /  ALT  11  /  AlkPhos  62  04-15    PT/INR - ( 15 Apr 2024 15:49 )   PT: 11.50 sec;   INR: 1.01 ratio       PTT - ( 15 Apr 2024 15:49 )  PTT:34.8 sec    Imaging:   Radiographs of pelvis, R hip, R femur demonstrate  -minimally displaced R subcapital femoral neck fracture  -s/p L MARIA VICTORIA with components in place, no signs of lucency or loosening   -mild degenerative changes    A/P: 89y Female with minimally displaced subcapital right femoral neck fracture. Nature of osseous injury discussed with patient, family. Recommendation for operative management to decrease pain, preserve functional status, early ambulation, and decrease morbidity/mortality associated with non-operative treatment. R/B/A of surgery vs non-operative management discussed. Patient/ family would like to proceed with surgery.     - Add on for R hip CRPP vs james vs total arthroplasty tomorrow 4/16/24  - NWB RLE  - Pain control per primary team  - DVT PPx per primary -- recommend dose of LVX/HSQ tonight. Hold tomorrow on morning of procedure  - Patient requires Internal Medicine pre-op clearance / risk stratification / medical optimization  - Pre-Op CBC, CMP/BMP, PT/PTT/INR, Type & Screen, CXR, EKG  - Trend Hgb  - 2u RBC on hold for surgery  - NPO for surgery tomorrow      ORTHOPAEDIC SURGERY CONSULT NOTE    Reason for Consult: R femoral neck fracture    HPI: 89yFemale with history of HTN who presents for R hip pain after mechanical fall earlier today. Patient describes mechanical fall while going to sit down on a park bench. Immediately noted R hip pain, inability to ambulate. No head trauma or LOC. Denies pain elsewhere in BUE, LLE. No associated numbness/ tingling distally.     Patient known to our service for previous L femoral neck fracture, s/p L primary MARIA VICTORIA 11/1/2021 with Dr. Matamoros. Patient states after surgery, she briefly used cane/ walker, but baseline now is no assistive device. She lives alone in an apartment, elevator building, and is proficient in ADLs. She is described as active -- enjoys walks and running errands. She denies any antecedent R hip pain.     PAST MEDICAL & SURGICAL HISTORY:  HTN    Allergies: Shrimp (Rash)  NKDA (Unknown)    Medications:     PHYSICAL EXAM:  Vital Signs Last 24 Hrs  T(C): 36.1 (15 Apr 2024 13:03), Max: 36.1 (15 Apr 2024 13:03)  T(F): 97 (15 Apr 2024 13:03), Max: 97 (15 Apr 2024 13:03)  HR: 78 (15 Apr 2024 13:03) (78 - 78)  BP: 122/86 (15 Apr 2024 13:03) (122/86 - 122/86)  BP(mean): --  RR: 18 (15 Apr 2024 13:03) (18 - 18)  SpO2: 96% (15 Apr 2024 13:03) (96% - 96%)    Parameters below as of 15 Apr 2024 13:03  Patient On (Oxygen Delivery Method): room air    Physical exam:  Awake, alert  Non-labored breathing    BUE  Skin tear R forearm  diffuse ecchymosis BUE  No swelling/erythema noted  No TTP, stepoff, deformity   ROM grossly intact in all joints, all planes  Motor/ sensation grossly intact  WWP distally    RLE  Skin intact  (+) deformity R hip, shortened RLE  ROM limited 2/2 pain  TTP R hip  NTTP R knee, tib/fib, ankle, foot  Compartments soft and compressible, no pain w/ passive stretch of digits  SILT SP/DP/T/Sural/Saph  Firing TA/EHL/FHL/GS  DP pulse 2+, cap refill <2    LLE  Anterior hip incision well healed  No swelling/erythema/ecchymosis noted  No TTP, stepoff, deformity   Negative log roll, axial load  ROM grossly intact in all joints, all planes  Motor/ sensation grossly intact  WWP distally    Labs:                        14.8   10.31 )-----------( 212      ( 15 Apr 2024 15:49 )             44.4     04-15    141  |  103  |  15  ----------------------------<  114<H>  4.3   |  25  |  0.8    Ca    9.5      15 Apr 2024 15:49    TPro  7.2  /  Alb  4.8  /  TBili  0.9  /  DBili  x   /  AST  19  /  ALT  11  /  AlkPhos  62  04-15    PT/INR - ( 15 Apr 2024 15:49 )   PT: 11.50 sec;   INR: 1.01 ratio       PTT - ( 15 Apr 2024 15:49 )  PTT:34.8 sec    Imaging:   Radiographs of pelvis, R hip, R femur demonstrate  -minimally displaced R subcapital femoral neck fracture  -s/p L MARIA VICTORIA with components in place, no signs of lucency or loosening   -mild degenerative changes    A/P: 89y Female with minimally displaced subcapital right femoral neck fracture. Nature of osseous injury discussed with patient, family. Recommendation for operative management to decrease pain, preserve functional status, early ambulation, and decrease morbidity/mortality associated with non-operative treatment. R/B/A of surgery vs non-operative management discussed. Patient/ family would like to proceed with surgery.     - Add on for R hip CRPP vs james vs total arthroplasty tomorrow 4/16/24  - NWB RLE  - Pain control per primary team  - DVT PPx per primary -- recommend dose of LVX/HSQ tonight. Hold tomorrow on morning of procedure  - Patient requires Internal Medicine pre-op clearance / risk stratification / medical optimization  - Pre-Op CBC, CMP/BMP, PT/PTT/INR, Type & Screen, CXR, EKG  - Trend Hgb  - 2u RBC on hold for surgery  - NPO after midnight for surgery tomorrow     ---------------------------------    Discussed with Dr. Matamoros, recommend transfer to [facility] for surgery tomorrow  Admit to medicine  Plan discussed with ED

## 2024-04-15 NOTE — ED ADULT NURSE NOTE - HIV OFFER
[History reviewed] : History reviewed. [Medications and Allergies reviewed] : Medications and allergies reviewed. Opt out

## 2024-04-15 NOTE — ED ADULT NURSE NOTE - NS_SISCREENINGSR_GEN_ALL_ED
BP goal <130/80, continue regimen  Given 30-days supply of Norvasc and metoprolol until labs completed Negative

## 2024-04-15 NOTE — ED ADULT TRIAGE NOTE - CHIEF COMPLAINT QUOTE
Pt presents to the ED c/o of fall in the park. Pt AOx4 (-) HT (-) A/C (-) LOC. Pt states she was walking in the park where she slipped and fell. Pt states she hit the corner of a park bench on her R hip and is now unable to bear weight on it.

## 2024-04-15 NOTE — ED PROVIDER NOTE - PROGRESS NOTE DETAILS
I, Dr. Ab Cole, discussed the case with hospitalist attending -Dr. Bernard accepted the patient for admission patient to be transferred to [facility].. Authored by Dr. Cole: Signed out to me by Dr. Myrick at 6 PM.  Patient status post fall with hip fracture.  Pending completion orthopedic evaluation.

## 2024-04-16 ENCOUNTER — RESULT REVIEW (OUTPATIENT)
Age: 89
End: 2024-04-16

## 2024-04-16 LAB
ANION GAP SERPL CALC-SCNC: 16 MMOL/L — HIGH (ref 7–14)
BLD GP AB SCN SERPL QL: SIGNIFICANT CHANGE UP
BUN SERPL-MCNC: 13 MG/DL — SIGNIFICANT CHANGE UP (ref 10–20)
CALCIUM SERPL-MCNC: 9.3 MG/DL — SIGNIFICANT CHANGE UP (ref 8.4–10.5)
CHLORIDE SERPL-SCNC: 96 MMOL/L — LOW (ref 98–110)
CO2 SERPL-SCNC: 25 MMOL/L — SIGNIFICANT CHANGE UP (ref 17–32)
CREAT SERPL-MCNC: 0.7 MG/DL — SIGNIFICANT CHANGE UP (ref 0.7–1.5)
EGFR: 81 ML/MIN/1.73M2 — SIGNIFICANT CHANGE UP
GLUCOSE SERPL-MCNC: 116 MG/DL — HIGH (ref 70–99)
HCT VFR BLD CALC: 42.7 % — SIGNIFICANT CHANGE UP (ref 37–47)
HGB BLD-MCNC: 14.9 G/DL — SIGNIFICANT CHANGE UP (ref 12–16)
MAGNESIUM SERPL-MCNC: 1.9 MG/DL — SIGNIFICANT CHANGE UP (ref 1.8–2.4)
MCHC RBC-ENTMCNC: 30.5 PG — SIGNIFICANT CHANGE UP (ref 27–31)
MCHC RBC-ENTMCNC: 34.9 G/DL — SIGNIFICANT CHANGE UP (ref 32–37)
MCV RBC AUTO: 87.5 FL — SIGNIFICANT CHANGE UP (ref 81–99)
NRBC # BLD: 0 /100 WBCS — SIGNIFICANT CHANGE UP (ref 0–0)
PLATELET # BLD AUTO: 188 K/UL — SIGNIFICANT CHANGE UP (ref 130–400)
PMV BLD: 9.9 FL — SIGNIFICANT CHANGE UP (ref 7.4–10.4)
POTASSIUM SERPL-MCNC: 3.8 MMOL/L — SIGNIFICANT CHANGE UP (ref 3.5–5)
POTASSIUM SERPL-SCNC: 3.8 MMOL/L — SIGNIFICANT CHANGE UP (ref 3.5–5)
RBC # BLD: 4.88 M/UL — SIGNIFICANT CHANGE UP (ref 4.2–5.4)
RBC # FLD: 13.2 % — SIGNIFICANT CHANGE UP (ref 11.5–14.5)
SODIUM SERPL-SCNC: 137 MMOL/L — SIGNIFICANT CHANGE UP (ref 135–146)
WBC # BLD: 10.21 K/UL — SIGNIFICANT CHANGE UP (ref 4.8–10.8)
WBC # FLD AUTO: 10.21 K/UL — SIGNIFICANT CHANGE UP (ref 4.8–10.8)

## 2024-04-16 PROCEDURE — 27130 TOTAL HIP ARTHROPLASTY: CPT | Mod: RT

## 2024-04-16 PROCEDURE — 99232 SBSQ HOSP IP/OBS MODERATE 35: CPT

## 2024-04-16 PROCEDURE — 88305 TISSUE EXAM BY PATHOLOGIST: CPT | Mod: 26

## 2024-04-16 PROCEDURE — 93010 ELECTROCARDIOGRAM REPORT: CPT

## 2024-04-16 PROCEDURE — 88311 DECALCIFY TISSUE: CPT | Mod: 26

## 2024-04-16 RX ORDER — HYDROMORPHONE HYDROCHLORIDE 2 MG/ML
1 INJECTION INTRAMUSCULAR; INTRAVENOUS; SUBCUTANEOUS
Refills: 0 | Status: DISCONTINUED | OUTPATIENT
Start: 2024-04-16 | End: 2024-04-16

## 2024-04-16 RX ORDER — TRAMADOL HYDROCHLORIDE 50 MG/1
50 TABLET ORAL EVERY 4 HOURS
Refills: 0 | Status: DISCONTINUED | OUTPATIENT
Start: 2024-04-16 | End: 2024-04-17

## 2024-04-16 RX ORDER — CEFAZOLIN SODIUM 1 G
2000 VIAL (EA) INJECTION EVERY 8 HOURS
Refills: 0 | Status: COMPLETED | OUTPATIENT
Start: 2024-04-16 | End: 2024-04-17

## 2024-04-16 RX ORDER — ONDANSETRON 8 MG/1
4 TABLET, FILM COATED ORAL ONCE
Refills: 0 | Status: DISCONTINUED | OUTPATIENT
Start: 2024-04-16 | End: 2024-04-16

## 2024-04-16 RX ORDER — ACETAMINOPHEN 500 MG
650 TABLET ORAL EVERY 6 HOURS
Refills: 0 | Status: DISCONTINUED | OUTPATIENT
Start: 2024-04-16 | End: 2024-04-19

## 2024-04-16 RX ORDER — POLYETHYLENE GLYCOL 3350 17 G/17G
17 POWDER, FOR SOLUTION ORAL DAILY
Refills: 0 | Status: DISCONTINUED | OUTPATIENT
Start: 2024-04-16 | End: 2024-04-19

## 2024-04-16 RX ORDER — POLYETHYLENE GLYCOL 3350 17 G/17G
17 POWDER, FOR SOLUTION ORAL DAILY
Refills: 0 | Status: DISCONTINUED | OUTPATIENT
Start: 2024-04-16 | End: 2024-04-16

## 2024-04-16 RX ORDER — LISINOPRIL 2.5 MG/1
5 TABLET ORAL DAILY
Refills: 0 | Status: DISCONTINUED | OUTPATIENT
Start: 2024-04-16 | End: 2024-04-19

## 2024-04-16 RX ORDER — HYDROMORPHONE HYDROCHLORIDE 2 MG/ML
0.5 INJECTION INTRAMUSCULAR; INTRAVENOUS; SUBCUTANEOUS
Refills: 0 | Status: DISCONTINUED | OUTPATIENT
Start: 2024-04-16 | End: 2024-04-16

## 2024-04-16 RX ORDER — KETOROLAC TROMETHAMINE 30 MG/ML
15 SYRINGE (ML) INJECTION EVERY 6 HOURS
Refills: 0 | Status: DISCONTINUED | OUTPATIENT
Start: 2024-04-16 | End: 2024-04-17

## 2024-04-16 RX ORDER — ASPIRIN/CALCIUM CARB/MAGNESIUM 324 MG
81 TABLET ORAL EVERY 12 HOURS
Refills: 0 | Status: DISCONTINUED | OUTPATIENT
Start: 2024-04-16 | End: 2024-04-19

## 2024-04-16 RX ORDER — METOPROLOL TARTRATE 50 MG
25 TABLET ORAL
Refills: 0 | Status: DISCONTINUED | OUTPATIENT
Start: 2024-04-16 | End: 2024-04-19

## 2024-04-16 RX ORDER — OXYCODONE HYDROCHLORIDE 5 MG/1
5 TABLET ORAL EVERY 4 HOURS
Refills: 0 | Status: DISCONTINUED | OUTPATIENT
Start: 2024-04-16 | End: 2024-04-16

## 2024-04-16 RX ORDER — CHLORHEXIDINE GLUCONATE 213 G/1000ML
1 SOLUTION TOPICAL
Refills: 0 | Status: DISCONTINUED | OUTPATIENT
Start: 2024-04-16 | End: 2024-04-19

## 2024-04-16 RX ORDER — SODIUM CHLORIDE 9 MG/ML
1000 INJECTION, SOLUTION INTRAVENOUS
Refills: 0 | Status: DISCONTINUED | OUTPATIENT
Start: 2024-04-16 | End: 2024-04-16

## 2024-04-16 RX ORDER — SENNA PLUS 8.6 MG/1
2 TABLET ORAL AT BEDTIME
Refills: 0 | Status: DISCONTINUED | OUTPATIENT
Start: 2024-04-16 | End: 2024-04-19

## 2024-04-16 RX ORDER — DEXTROSE MONOHYDRATE, SODIUM CHLORIDE, AND POTASSIUM CHLORIDE 50; .745; 4.5 G/1000ML; G/1000ML; G/1000ML
1000 INJECTION, SOLUTION INTRAVENOUS
Refills: 0 | Status: DISCONTINUED | OUTPATIENT
Start: 2024-04-16 | End: 2024-04-17

## 2024-04-16 RX ORDER — DEXTROSE MONOHYDRATE, SODIUM CHLORIDE, AND POTASSIUM CHLORIDE 50; .745; 4.5 G/1000ML; G/1000ML; G/1000ML
1000 INJECTION, SOLUTION INTRAVENOUS
Refills: 0 | Status: DISCONTINUED | OUTPATIENT
Start: 2024-04-16 | End: 2024-04-16

## 2024-04-16 RX ORDER — SENNA PLUS 8.6 MG/1
2 TABLET ORAL AT BEDTIME
Refills: 0 | Status: DISCONTINUED | OUTPATIENT
Start: 2024-04-16 | End: 2024-04-16

## 2024-04-16 RX ORDER — LISINOPRIL 2.5 MG/1
5 TABLET ORAL DAILY
Refills: 0 | Status: CANCELLED | OUTPATIENT
Start: 2024-04-17 | End: 2024-04-16

## 2024-04-16 RX ADMIN — Medication 100 MILLIGRAM(S): at 23:46

## 2024-04-16 RX ADMIN — DEXTROSE MONOHYDRATE, SODIUM CHLORIDE, AND POTASSIUM CHLORIDE 75 MILLILITER(S): 50; .745; 4.5 INJECTION, SOLUTION INTRAVENOUS at 12:27

## 2024-04-16 RX ADMIN — CHLORHEXIDINE GLUCONATE 1 APPLICATION(S): 213 SOLUTION TOPICAL at 06:29

## 2024-04-16 RX ADMIN — LISINOPRIL 5 MILLIGRAM(S): 2.5 TABLET ORAL at 06:30

## 2024-04-16 RX ADMIN — SENNA PLUS 2 TABLET(S): 8.6 TABLET ORAL at 23:46

## 2024-04-16 NOTE — CHART NOTE - NSCHARTNOTEFT_GEN_A_CORE
PACU ANESTHESIA ADMISSION NOTE      Procedure: Right total hip replacement      Post op diagnosis:  Status post right hip replacement        ____  Intubated  TV:______       Rate: ______      FiO2: ______    __x__  Patent Airway    ___x_  Full return of protective reflexes    __x__  Full recovery from anesthesia / back to baseline     Vitals:   T: 97.8           R: 16             BP:  114/56             Sat: 95%                   P: 93      Mental Status:  __x__ Awake   ___x__ Alert   _____ Drowsy   _____ Sedated    Nausea/Vomiting:  __x__ NO  ______Yes,   See Post - Op Orders          Pain Scale (0-10):  _____    Treatment: ____ None    __x__ See Post - Op/PCA Orders    Post - Operative Fluids:   ____ Oral   __x__ See Post - Op Orders    Plan: Discharge:   ____Home       __x___Floor     _____Critical Care    _____  Other:_________________

## 2024-04-16 NOTE — PATIENT PROFILE ADULT - NSPROPTRIGHTBILLOFRIGHTS_GEN_A_NUR
Patient calling to check status of prescription. Advised instructions were clarified.     To Dr. Manley as covering - can you sign new prescription for norco with clarified instructions? Med pended.    patient

## 2024-04-16 NOTE — PRE-ANESTHESIA EVALUATION ADULT - NSANTHOSAYNRD_GEN_A_CORE
No. AURN screening performed.  STOP BANG Legend: 0-2 = LOW Risk; 3-4 = INTERMEDIATE Risk; 5-8 = HIGH Risk

## 2024-04-16 NOTE — PROGRESS NOTE ADULT - SUBJECTIVE AND OBJECTIVE BOX
GRZEGORZ BURT  89y  [facility]-S 4I 018 A      Patient is a 89y old  Female who presents with a chief complaint of Right femoral neck fracture (16 Apr 2024 05:54)      INTERVAL HPI/OVERNIGHT EVENTS:        REVIEW OF SYSTEMS:        FAMILY HISTORY:    T(C): 37.4 (04-16-24 @ 04:53), Max: 37.4 (04-16-24 @ 04:53)  HR: 83 (04-16-24 @ 04:53) (78 - 83)  BP: 160/86 (04-16-24 @ 04:53) (116/89 - 160/86)  RR: 18 (04-16-24 @ 04:53) (18 - 18)  SpO2: 98% (04-16-24 @ 04:53) (96% - 98%)  Wt(kg): --Vital Signs Last 24 Hrs  T(C): 37.4 (16 Apr 2024 04:53), Max: 37.4 (16 Apr 2024 04:53)  T(F): 99.4 (16 Apr 2024 04:53), Max: 99.4 (16 Apr 2024 04:53)  HR: 83 (16 Apr 2024 04:53) (78 - 83)  BP: 160/86 (16 Apr 2024 04:53) (116/89 - 160/86)  BP(mean): --  RR: 18 (16 Apr 2024 04:53) (18 - 18)  SpO2: 98% (16 Apr 2024 04:53) (96% - 98%)    Parameters below as of 16 Apr 2024 04:53  Patient On (Oxygen Delivery Method): room air        PHYSICAL EXAM:  GENERAL: NAD, well-groomed, well-developed  HEAD:  Atraumatic, Normocephalic  EYES: EOMI, PERRLA, conjunctiva and sclera clear  ENMT: No tonsillar erythema, exudates, or enlargement; Moist mucous membranes, Good dentition, No lesions  NECK: Supple, No JVD, Normal thyroid  NERVOUS SYSTEM:  Alert & Oriented X3, Good concentration; Motor Strength 5/5 B/L upper and lower extremities; DTRs 2+ intact and symmetric  PULM: Clear to auscultation bilaterally  CARDIAC: Regular rate and rhythm; No murmurs, rubs, or gallops  GI: Soft, Nontender, Nondistended; Bowel sounds present  EXTREMITIES:  2+ Peripheral Pulses,     Consultant(s) Notes Reviewed:  [x ] YES  [ ] NO  Care Discussed with Consultants/Other Providers [ x] YES  [ ] NO    LABS:                            14.8   10.31 )-----------( 212      ( 15 Apr 2024 15:49 )             44.4   04-15    141  |  103  |  15  ----------------------------<  114<H>  4.3   |  25  |  0.8    Ca    9.5      15 Apr 2024 15:49    TPro  7.2  /  Alb  4.8  /  TBili  0.9  /  DBili  x   /  AST  19  /  ALT  11  /  AlkPhos  62  04-15            acetaminophen     Tablet .. 650 milliGRAM(s) Oral every 6 hours PRN  chlorhexidine 2% Cloths 1 Application(s) Topical <User Schedule>  enoxaparin Injectable 40 milliGRAM(s) SubCutaneous once  lisinopril 5 milliGRAM(s) Oral daily  metoprolol tartrate 25 milliGRAM(s) Oral two times a day        89yo F w/ pmhx of HTN and L femoral neck fracture 2021 s/p MARIA VICTORIA with Dr. Matamoros presents to ED for mechanical fall and R hip fracture. Admitted for R hip fracture and plan is for transfer [facility] for surgery by orthopedics tomorrow 4/16.     1. Mechanical fall complicated by Right hip fracture  - Admit to medicine                - CXR:WNL   - moderate risk patient for moderate risk surgery   - RCRI 0, class I risk, 3.9% risk for perioperative MACE; no need to assess METS  - s/p ortho eval in ED: Add on for R hip CRPP vs james vs total arthroplasty tomorrow 4/16/24; transfer to [facility]   - CT pelvis: Acute subcapital fracture of the right femoral neck.  - PT/OT/physiatry after surgery    2.AAA/HTN  - CT pelvis: Increased size of infrarenal abdominal aortic aneurysm measuring up to 4.3 cm; f/u o/p  - c/w lisinopril 5mg qd and metoprolol tartrate 25mg bid     DVT ppx: lovenox and SCDs  Dispo: from home, walks independently; ortho surgery tomorrow 4/16, transfer to [facility], PT/OT/physiatry     GRZEGORZ BURT  89y  [facility]-S 4I 018 A      Patient is a 89y old  Female who presents with a chief complaint of Right femoral neck fracture (16 Apr 2024 05:54)      INTERVAL HPI/OVERNIGHT EVENTS:    Patient feels well .  pain upon moving  otherwise, no complains   no overnight events       FAMILY HISTORY:    T(C): 37.4 (04-16-24 @ 04:53), Max: 37.4 (04-16-24 @ 04:53)  HR: 83 (04-16-24 @ 04:53) (78 - 83)  BP: 160/86 (04-16-24 @ 04:53) (116/89 - 160/86)  RR: 18 (04-16-24 @ 04:53) (18 - 18)  SpO2: 98% (04-16-24 @ 04:53) (96% - 98%)  Wt(kg): --Vital Signs Last 24 Hrs  T(C): 37.4 (16 Apr 2024 04:53), Max: 37.4 (16 Apr 2024 04:53)  T(F): 99.4 (16 Apr 2024 04:53), Max: 99.4 (16 Apr 2024 04:53)  HR: 83 (16 Apr 2024 04:53) (78 - 83)  BP: 160/86 (16 Apr 2024 04:53) (116/89 - 160/86)  BP(mean): --  RR: 18 (16 Apr 2024 04:53) (18 - 18)  SpO2: 98% (16 Apr 2024 04:53) (96% - 98%)    Parameters below as of 16 Apr 2024 04:53  Patient On (Oxygen Delivery Method): room air        PHYSICAL EXAM:  GENERAL: NAD, well-groomed, well-developed  NERVOUS SYSTEM:  Alert & Oriented X3,  PULM: Clear to auscultation bilaterally  CARDIAC: Regular rate and rhythm;  GI: Soft, Nontender, Nondistended; Bowel sounds present  EXTREMITIES:  2+ Peripheral Pulses,     Consultant(s) Notes Reviewed:  [x ] YES  [ ] NO  Care Discussed with Consultants/Other Providers [ x] YES  [ ] NO    LABS:                            14.8   10.31 )-----------( 212      ( 15 Apr 2024 15:49 )             44.4   04-15    141  |  103  |  15  ----------------------------<  114<H>  4.3   |  25  |  0.8    Ca    9.5      15 Apr 2024 15:49    TPro  7.2  /  Alb  4.8  /  TBili  0.9  /  DBili  x   /  AST  19  /  ALT  11  /  AlkPhos  62  04-15            acetaminophen     Tablet .. 650 milliGRAM(s) Oral every 6 hours PRN  chlorhexidine 2% Cloths 1 Application(s) Topical <User Schedule>  enoxaparin Injectable 40 milliGRAM(s) SubCutaneous once  lisinopril 5 milliGRAM(s) Oral daily  metoprolol tartrate 25 milliGRAM(s) Oral two times a day        89yo F w/ pmhx of HTN and L femoral neck fracture 2021 s/p MARIA VICTORIA with Dr. Matamoros presents to ED for mechanical fall and R hip fracture. Admitted for R hip fracture and [place] by orthopedics tomorrow 4/16.     1. Mechanical fall complicated by Right hip fracture awaiting surgery   - Admit to medicine                - CXR:WNL   - moderate risk patient for moderate risk surgery   - NPO/IVF   - RCRI 0, class I risk, 3.9% risk for perioperative MACE; no need to assess METS  - s/p ortho eval in ED: Add on for R hip CRPP vs james vs total arthroplasty tomorrow 4/16/24; transfer to [facility]S   - CT pelvis: Acute subcapital fracture of the right femoral neck.  - PT/OT/physiatry after surgery    2.AAA/HTN  - CT pelvis: Increased size of infrarenal abdominal aortic aneurysm measuring up to 4.3 cm; f/u o/p  - c/w lisinopril 5mg qd and metoprolol tartrate 25mg bid     DVT ppx: lovenox and SCDs  Dispo: from home, walks independently;

## 2024-04-16 NOTE — PATIENT PROFILE ADULT - FALL HARM RISK - HARM RISK INTERVENTIONS

## 2024-04-16 NOTE — PATIENT PROFILE ADULT - DOES PATIENT HAVE ADVANCE DIRECTIVE
[facility] Surg  Podiatry  Consult Note    Patient Name: Tasha Hawley  MRN: [id number]  Admission Date: 9/9/2023  Hospital Length of Stay: 0 days  Attending Physician: Jon Soto MD  Primary Care Provider: Mesfin Hodges II, MD     Inpatient consult to Podiatry  Consult performed by: Tasha Cortes DPM  Consult ordered by: Jon Soto MD        Subjective:     History of Present Illness:  Pt is a 66 y/o female  has a past medical history of Anxiety, Arthritis, Bilateral lower extremity edema, Carotid artery occlusion, Cataract, CHF (congestive heart failure), Coronary artery disease, Depression, Fever blister, Hard of hearing, Hypokalemia, Hyponatremia, Hypothyroid, Iron deficiency anemia, Lumbar radiculopathy, Ocular migraine, Other emphysema, Renal disorder, and Sleep apnea. Admitted for cellulitis. History of fall and injured her great toe, xray with minimally displaced fracture. Relates to bilateral foot pain. No further pedal complaints      Scheduled Meds:   ampicillin-sulbactim (UNASYN) IVPB  3 g Intravenous Q6H    aspirin  81 mg Oral Daily    atorvastatin  80 mg Oral Daily    enoxparin  40 mg Subcutaneous Daily    fluconazole  100 mg Oral Daily    FLUoxetine  60 mg Oral Daily    fluticasone propionate  2 spray Each Nostril Daily    furosemide (LASIX) injection  40 mg Intravenous Q12H    levothyroxine  150 mcg Oral Before breakfast    miconazole nitrate 2%   Topical (Top) BID    pantoprazole  40 mg Oral Daily    potassium chloride SA  20 mEq Oral Daily    QUEtiapine  200 mg Oral QHS    senna  3 tablet Oral Nightly    tiotropium bromide  2 puff Inhalation Daily    traZODone  300 mg Oral QHS     Continuous Infusions:   HYDROMORPHONE intrathecal pain pump compound       PRN Meds:dextrose 10%, dextrose 10%, glucagon (human recombinant), glucose, glucose, HYDROcodone-acetaminophen, HYDROmorphone, naloxone, sodium chloride 0.9%    Review of patient's allergies indicates:    Allergen Reactions    (d)-limonene flavor      Other reaction(s): difficult intubation  Other reaction(s): Difficulty breathing    Bactrim [sulfamethoxazole-trimethoprim] Anaphylaxis    Benadryl [diphenhydramine hcl] Shortness Of Breath    Fentanyl Itching, Nausea And Vomiting and Swelling             Imitrex [sumatriptan succinate] Shortness Of Breath    Percocet [oxycodone-acetaminophen] Shortness Of Breath    Topamax [topiramate] Shortness Of Breath    Vancomycin Anaphylaxis     Rash    Butorphanol tartrate     Darvocet a500 [propoxyphene n-acetaminophen]      Other reaction(s): Difficulty breathing    Evening primrose (oenothera biennis)     Lyrica [pregabalin] Other (See Comments)     tremors    White petrolatum-zinc     Zinc oxide-white petrolatum      Other reaction(s): Difficulty breathing    Latex, natural rubber Itching and Rash    Phenytoin Rash and Other (See Comments)     Trouble breathing        Past Medical History:   Diagnosis Date    Anxiety     Arthritis     Bilateral lower extremity edema     severe chronic    Carotid artery occlusion     Cataract     CHF (congestive heart failure)     Coronary artery disease     subtotalled LAD with collateral    Depression     Fever blister     Hard of hearing     Hypokalemia 01/09/2023    Hyponatremia 02/04/2022    Hypothyroid     Iron deficiency anemia     Lumbar radiculopathy     with chronic pain    Ocular migraine     Other emphysema 05/22/2023    Renal disorder     Sleep apnea     cpap     Past Surgical History:   Procedure Laterality Date    ADENOIDECTOMY      BACK SURGERY      x 12    CARDIAC CATHETERIZATION  2016    subtotalled LAD with right to left collaterals    CATARACT EXTRACTION W/  INTRAOCULAR LENS IMPLANT Left     Dr Coleman     CYSTOSCOPIC LITHOLAPAXY N/A 6/27/2019    Procedure: CYSTOLITHOLAPAXY;  Surgeon: Shireen Mayo MD;  Location: [facility] OR [address] [place] [address];  Service: Urology;  Laterality: N/A;     CYSTOSCOPIC LITHOLAPAXY N/A 9/3/2019    Procedure: CYSTOLITHOLAPAXY;  Surgeon: Shireen Mayo MD;  Location: [facility] OR 2ND FLR;  Service: Urology;  Laterality: N/A;    CYSTOSCOPY N/A 7/13/2021    Procedure: CYSTOSCOPY;  Surgeon: Shireen Mayo MD;  Location: [facility] OR 1ST FLR;  Service: Urology;  Laterality: N/A;    CYSTOSCOPY  11/16/2021    Procedure: CYSTOSCOPY;  Surgeon: Shireen Mayo MD;  Location: [facility] OR 1ST FLR;  Service: Urology;;    CYSTOSCOPY  7/19/2022    Procedure: CYSTOSCOPY;  Surgeon: Shireen Mayo MD;  Location: [facility] OR 1ST FLR;  Service: Urology;;    CYSTOSCOPY WITH INJECTION OF PERIURETHRAL BULKING AGENT  7/19/2022    Procedure: CYSTOSCOPY, WITH PERIURETHRAL BULKING AGENT INJECTION-MACROPLASTIQUE;  Surgeon: Shireen Mayo MD;  Location: [facility] OR 1ST FLR;  Service: Urology;;    CYSTOSCOPY WITH INJECTION OF PERIURETHRAL BULKING AGENT N/A 3/28/2023    Procedure: CYSTOSCOPY, WITH PERIURETHRAL BULKING AGENT INJECTION;  Surgeon: Shireen Mayo MD;  Location: [facility] OR 1ST FLR;  Service: Urology;  Laterality: N/A;  [place]    CYSTOSCOPY,WITH BOTULINUM TOXIN INJECTION N/A 12/13/2022    Procedure: CYSTOSCOPY,WITH BOTULINUM TOXIN INJECTION;  Surgeon: Shireen Mayo MD;  Location: [facility] OR 1ST FLR;  Service: Urology;  Laterality: N/A;  300 U    CYSTOSCOPY,WITH BOTULINUM TOXIN INJECTION N/A 3/28/2023    Procedure: CYSTOSCOPY,WITH BOTULINUM TOXIN INJECTION;  Surgeon: Shireen Mayo MD;  Location: [facility] OR 1ST FLR;  Service: Urology;  Laterality: N/A;  45 MIN.    300 UNITS    ESOPHAGOGASTRODUODENOSCOPY N/A 5/23/2018    Procedure: ESOPHAGOGASTRODUODENOSCOPY (EGD);  Surgeon: Prince Vance MD;  Location: [facility] (4TH FLR);  Service: Endoscopy;  Laterality: N/A;  r/s 'd per Dr. Vance due to family emergency- ER    ESOPHAGOGASTRODUODENOSCOPY N/A 6/23/2023    Procedure: EGD (ESOPHAGOGASTRODUODENOSCOPY);  Surgeon: Juaquin Barry MD;  Location: [facility] ([address] [place] [address]);  Service:  Endoscopy;  Laterality: N/A;  Refferal: ROSEANNE MTZ  Order  tele enc 5/18/23  dx: history of a Nissen fundoplication  Additional Scheduling Information:  On home oxygen 2nd floor for airway protection also with a pain pump elevated BMI close to 40   Prep Gastroparesis   ins    HYSTERECTOMY  1975    endometriosis    INJECTION OF BOTULINUM TOXIN TYPE A  7/13/2021    Procedure: INJECTION, BOTULINUM TOXIN, 200units;  Surgeon: Shireen Mayo MD;  [facility];  Service: Urology;;    INJECTION OF BOTULINUM TOXIN TYPE A  11/16/2021    Procedure: INJECTION, BOTULINUM TOXIN, 200units;  Surgeon: Shireen Mayo MD;  [facility];  Service: Urology;;    INJECTION OF BOTULINUM TOXIN TYPE A  7/19/2022    Procedure: INJECTION, BOTULINUM TOXIN, 300 units ;  Surgeon: Shireen Mayo MD;  [facility];  Service: Urology;;    INSERTION, SUPRAPUBIC CATHETER N/A 12/13/2022    Procedure: INSERTION, SUPRAPUBIC CATHETER;  Surgeon: Shireen Mayo MD;  Location: [facility] OR [address] [place] [address];  Service: Urology;  Laterality: N/A;  exchange    pain pump placement      SQ Dilaudid Pump managed by Dr. Hillman, Pain Management    REMOVAL OF BONE SPUR OF FOOT Bilateral 9/16/2022    Procedure: EXCISION ARTHRITIC BONE, BILATERAL FOOT;  Surgeon: Adam Mcguire [facility];  Location: [facility] OR;  Service: Podiatry;  Laterality: Bilateral;    REPLACEMENT OF BACLOFEN PUMP N/A 8/2/2023    Procedure: REPLACEMENT, BACLOFEN PUMP;  Surgeon: Smitha Condon MD;  [facility];  Service: Neurosurgery;  Laterality: N/A;  Anes: Gen  Blood: Type & Screen  Pos: Left Lat  Intrathecal Pump  Bed: Reg Reversed  Rad: C-arm  Pump: 40 cc, [employer] [place]    REPLACEMENT OF CATHETER N/A 10/31/2019    Procedure: REPLACEMENT, CATHETER-SUPRAPUBIC;  Surgeon: Shireen Mayo MD;  Location: [facility] OR [address] [place] [address];  Service: Urology;  Laterality: N/A;    SPINAL CORD STIMULATOR REMOVAL      before [place]    SPINE SURGERY   "5-13-13    CERVICAL FUSION    TONSILLECTOMY         Family History       Problem Relation (Age of Onset)    Cancer Mother (55), Father    Cirrhosis Paternal Aunt, Paternal Uncle    Colon cancer Maternal Uncle    Esophageal cancer Father    Heart disease Maternal Uncle    Liver disease Paternal Aunt, Paternal Uncle    No Known Problems Brother, Brother, Sister, Maternal Aunt, Maternal Grandfather, Paternal Grandmother, Paternal Grandfather    Parkinsonism Maternal Grandmother    Tremor Maternal Grandmother          Tobacco Use    Smoking status: Never    Smokeless tobacco: Never   Substance and Sexual Activity    Alcohol use: Never    Drug use: Yes     Types: Marijuana     Comment: "medical marijuana gummies"    Sexual activity: Not on file     Review of Systems   Constitutional:  Negative for chills, diaphoresis, fatigue and fever.   Respiratory:  Negative for cough and shortness of breath.    Cardiovascular:  Positive for leg swelling. Negative for chest pain.   Gastrointestinal:  Negative for nausea and vomiting.   Musculoskeletal:  Negative for arthralgias, back pain, gait problem and joint swelling.   Skin:  Negative for pallor and rash.   Neurological:  Negative for tremors and speech difficulty.   Psychiatric/Behavioral:  Negative for agitation. The patient is not nervous/anxious.      Objective:     Vital Signs (Most Recent):  Temp: 97.1 °F (36.2 °C) (09/13/23 1523)  Pulse: (!) 54 (09/13/23 1523)  Resp: 20 (09/13/23 1523)  BP: (!) 100/49 (09/13/23 1523)  SpO2: 97 % (09/13/23 1523) Vital Signs (24h Range):  Temp:  [93.1 °F (33.9 °C)-98.5 °F (36.9 °C)] 97.1 °F (36.2 °C)  Pulse:  [53-66] 54  Resp:  [16-20] 20  SpO2:  [93 %-99 %] 97 %  BP: ()/(49-55) 100/49     Weight: 103.8 kg (228 lb 13.4 oz)  Body mass index is 38.08 kg/m².    Foot Exam    General  General Appearance: appears stated age and healthy   Orientation: alert and oriented to person, place, and time   Affect: appropriate       Right " "Foot/Ankle     Inspection and Palpation  Ecchymosis: none  Tenderness: bony tenderness   Skin Exam: skin intact;     Neurovascular  Dorsalis pedis: 1+  Posterior tibial: 1+      Left Foot/Ankle      Inspection and Palpation  Ecchymosis: none  Tenderness: bony tenderness   Swelling: none   Skin Exam: skin intact;     Neurovascular  Dorsalis pedis: 1+  Posterior tibial: 1+          Laboratory:  A1C:   Recent Labs   Lab 07/09/23  0347   HGBA1C 5.2     Blood Cultures: No results for input(s): "LABBLOO" in the last 48 hours.  CBC:   Recent Labs   Lab 09/13/23  0900   WBC 8.63   RBC 4.09   HGB 10.9*   HCT 35.7*      MCV 87   MCH 26.7*   MCHC 30.5*     CMP:   Recent Labs   Lab 09/13/23  0859   *   CALCIUM 9.0   ALBUMIN 2.9*   PROT 6.3      K 3.7   CO2 36*   CL 94*   BUN 6*   CREATININE 0.8   ALKPHOS 143*   ALT 7*   AST 10   BILITOT 0.3     CRP: No results for input(s): "CRP" in the last 168 hours.  ESR: No results for input(s): "SEDRATE" in the last 168 hours.    Diagnostic Results:  X-Ray: I have reviewed all pertinent results/findings within the past 24 hours.  Minimally displaced fracture to proximal phalanx of hallux and possible fracture to calcaneus    Clinical Findings:  Tenderness to right 1st MTP and hallux. Tenderness to calcaneus and with calcaneal squeeze     Assessment/Plan:     Neuro  S/P insertion of intrathecal pump  Management per Hospital Medicine    Chronic pain syndrome  Management per Hospital Medicine    Psychiatric  Anxiety  Management per Hospital Medicine    Cardiac/Vascular  Idiopathic hypotension  Management per Hospital Medicine    Coronary artery disease involving native coronary artery of native heart with angina pectoris  Management per Hospital Medicine    Bradycardia  Management per Hospital Medicine    Congestive heart failure  Management per Hospital Medicine    Renal/  Hypokalemia  Management per Hospital Medicine    UTI (urinary tract infection)  Management per " Hospital Medicine    Recurrent UTI  Management per Hospital Medicine    ID  * Cellulitis  Antibiotic management per Hospital Medicine    Endocrine  Hypothyroid  Management per Hospital Medicine    Hypothyroidism (acquired)  Management per Hospital Medicine    GI  GERD (gastroesophageal reflux disease)  Management per Hospital Medicine    Orthopedic  Fracture, phalanx, foot  Per above    Other  Displaced fracture of proximal phalanx of right great toe, initial encounter for closed fracture  CT ordered, results with minimally displaced fracture of the proximal phalanx of the 1st digit with soft tissue swelling. No fracture to calcaneus on CT.   Recommend minimal weightbearing in fracture boot at all times while ambulating with assistance as tolerated  Rest, elevate  Continue follow up with Podiatry upon D/C, follows outpatient with Dr. Mcguire             Thank you for your consult. I will sign off. Please contact us if you have any additional questions.    Tasha Cortes DPM  Podiatry  [place] - Med Surg   No

## 2024-04-16 NOTE — PROGRESS NOTE ADULT - SUBJECTIVE AND OBJECTIVE BOX
ORTHOPEDIC SURGERY PRE OP NOTE      Diagnosis: Right femoral neck fracture    Planned Procedure: Right total hip arthroplasty    Consent: TO BE OBTAINED BY ATTENDING                   Risks/benefits/alternatives were discussed with the patient/family and they wish to proceed with surgery.       ANTICIPATED DATE OF PROCEDURE : 4/16/24  SCHEDULED CASE OR ADD-ON CASE: ADD-ON    Clearances:   [x] Medicine: Cleared    T(C): 37.4 (04-16-24 @ 04:53), Max: 37.4 (04-16-24 @ 04:53)  HR: 83 (04-16-24 @ 04:53) (78 - 83)  BP: 160/86 (04-16-24 @ 04:53) (116/89 - 160/86)  RR: 18 (04-16-24 @ 04:53) (18 - 18)  SpO2: 98% (04-16-24 @ 04:53) (96% - 98%)    Labs:                        14.8   10.31 )-----------( 212      ( 15 Apr 2024 15:49 )             44.4     04-15    141  |  103  |  15  ----------------------------<  114<H>  4.3   |  25  |  0.8    Ca    9.5      15 Apr 2024 15:49    TPro  7.2  /  Alb  4.8  /  TBili  0.9  /  DBili  x   /  AST  19  /  ALT  11  /  AlkPhos  62  04-15    PT/INR - ( 15 Apr 2024 15:49 )   PT: 11.50 sec;   INR: 1.01 ratio         PTT - ( 15 Apr 2024 15:49 )  PTT:34.8 sec  Type and Screen X 2:    [ ]EKG: completed, see chart  [ ]CXR: completed, see chart      DIET: NPO after midnight  IVF: per primary team      ANTICOAGULATION STATUS ( include name of anticoagulant) :  Can continue with LVX                                           A/P: Patient is a 89y y/o Female Pending Right total hip arthroplasty for right femoral neck fracture 4/16/24    [ ] -NPO and IVF @ midnight  [ ]pain control/analgesia prn per primary team   [ ]Incentive Spirometry   [ ]F/U Clearance  [ ]F/U Pending Labs  [ ]Notify Ortho with any questions- spectra [id number]    [ ]DISCUSSED WITH PRIMARY TEAM MEMBER (name of team member): ****  [ ]Date and Time DISCUSSED WITH PRIMARY TEAM MEMBER: ****

## 2024-04-17 LAB
ANION GAP SERPL CALC-SCNC: 14 MMOL/L — SIGNIFICANT CHANGE UP (ref 7–14)
BASOPHILS # BLD AUTO: 0.01 K/UL — SIGNIFICANT CHANGE UP (ref 0–0.2)
BASOPHILS NFR BLD AUTO: 0.1 % — SIGNIFICANT CHANGE UP (ref 0–1)
BUN SERPL-MCNC: 19 MG/DL — SIGNIFICANT CHANGE UP (ref 10–20)
CALCIUM SERPL-MCNC: 8.6 MG/DL — SIGNIFICANT CHANGE UP (ref 8.4–10.5)
CHLORIDE SERPL-SCNC: 99 MMOL/L — SIGNIFICANT CHANGE UP (ref 98–110)
CO2 SERPL-SCNC: 22 MMOL/L — SIGNIFICANT CHANGE UP (ref 17–32)
CREAT SERPL-MCNC: 0.9 MG/DL — SIGNIFICANT CHANGE UP (ref 0.7–1.5)
EGFR: 60 ML/MIN/1.73M2 — SIGNIFICANT CHANGE UP
EOSINOPHIL # BLD AUTO: 0.05 K/UL — SIGNIFICANT CHANGE UP (ref 0–0.7)
EOSINOPHIL NFR BLD AUTO: 0.6 % — SIGNIFICANT CHANGE UP (ref 0–8)
GLUCOSE SERPL-MCNC: 121 MG/DL — HIGH (ref 70–99)
HCT VFR BLD CALC: 37.7 % — SIGNIFICANT CHANGE UP (ref 37–47)
HGB BLD-MCNC: 13 G/DL — SIGNIFICANT CHANGE UP (ref 12–16)
IMM GRANULOCYTES NFR BLD AUTO: 0.3 % — SIGNIFICANT CHANGE UP (ref 0.1–0.3)
LYMPHOCYTES # BLD AUTO: 0.81 K/UL — LOW (ref 1.2–3.4)
LYMPHOCYTES # BLD AUTO: 10.2 % — LOW (ref 20.5–51.1)
MAGNESIUM SERPL-MCNC: 1.9 MG/DL — SIGNIFICANT CHANGE UP (ref 1.8–2.4)
MCHC RBC-ENTMCNC: 30.7 PG — SIGNIFICANT CHANGE UP (ref 27–31)
MCHC RBC-ENTMCNC: 34.5 G/DL — SIGNIFICANT CHANGE UP (ref 32–37)
MCV RBC AUTO: 89.1 FL — SIGNIFICANT CHANGE UP (ref 81–99)
MONOCYTES # BLD AUTO: 0.32 K/UL — SIGNIFICANT CHANGE UP (ref 0.1–0.6)
MONOCYTES NFR BLD AUTO: 4 % — SIGNIFICANT CHANGE UP (ref 1.7–9.3)
NEUTROPHILS # BLD AUTO: 6.76 K/UL — HIGH (ref 1.4–6.5)
NEUTROPHILS NFR BLD AUTO: 84.8 % — HIGH (ref 42.2–75.2)
NRBC # BLD: 0 /100 WBCS — SIGNIFICANT CHANGE UP (ref 0–0)
NT-PROBNP SERPL-SCNC: 2065 PG/ML — HIGH (ref 0–300)
PLATELET # BLD AUTO: 157 K/UL — SIGNIFICANT CHANGE UP (ref 130–400)
PMV BLD: 10.4 FL — SIGNIFICANT CHANGE UP (ref 7.4–10.4)
POTASSIUM SERPL-MCNC: 3.7 MMOL/L — SIGNIFICANT CHANGE UP (ref 3.5–5)
POTASSIUM SERPL-SCNC: 3.7 MMOL/L — SIGNIFICANT CHANGE UP (ref 3.5–5)
RBC # BLD: 4.23 M/UL — SIGNIFICANT CHANGE UP (ref 4.2–5.4)
RBC # FLD: 13.6 % — SIGNIFICANT CHANGE UP (ref 11.5–14.5)
SODIUM SERPL-SCNC: 135 MMOL/L — SIGNIFICANT CHANGE UP (ref 135–146)
WBC # BLD: 7.97 K/UL — SIGNIFICANT CHANGE UP (ref 4.8–10.8)
WBC # FLD AUTO: 7.97 K/UL — SIGNIFICANT CHANGE UP (ref 4.8–10.8)

## 2024-04-17 PROCEDURE — 71045 X-RAY EXAM CHEST 1 VIEW: CPT | Mod: 26

## 2024-04-17 PROCEDURE — 99232 SBSQ HOSP IP/OBS MODERATE 35: CPT

## 2024-04-17 RX ORDER — ENOXAPARIN SODIUM 100 MG/ML
40 INJECTION SUBCUTANEOUS EVERY 24 HOURS
Refills: 0 | Status: DISCONTINUED | OUTPATIENT
Start: 2024-04-17 | End: 2024-04-18

## 2024-04-17 RX ORDER — OXYCODONE HYDROCHLORIDE 5 MG/1
5 TABLET ORAL EVERY 6 HOURS
Refills: 0 | Status: DISCONTINUED | OUTPATIENT
Start: 2024-04-17 | End: 2024-04-19

## 2024-04-17 RX ADMIN — SENNA PLUS 2 TABLET(S): 8.6 TABLET ORAL at 21:30

## 2024-04-17 RX ADMIN — CHLORHEXIDINE GLUCONATE 1 APPLICATION(S): 213 SOLUTION TOPICAL at 06:11

## 2024-04-17 RX ADMIN — Medication 25 MILLIGRAM(S): at 05:56

## 2024-04-17 RX ADMIN — ENOXAPARIN SODIUM 40 MILLIGRAM(S): 100 INJECTION SUBCUTANEOUS at 08:29

## 2024-04-17 RX ADMIN — LISINOPRIL 5 MILLIGRAM(S): 2.5 TABLET ORAL at 05:56

## 2024-04-17 RX ADMIN — DEXTROSE MONOHYDRATE, SODIUM CHLORIDE, AND POTASSIUM CHLORIDE 75 MILLILITER(S): 50; .745; 4.5 INJECTION, SOLUTION INTRAVENOUS at 06:49

## 2024-04-17 RX ADMIN — Medication 25 MILLIGRAM(S): at 18:11

## 2024-04-17 RX ADMIN — Medication 650 MILLIGRAM(S): at 20:30

## 2024-04-17 RX ADMIN — Medication 100 MILLIGRAM(S): at 06:12

## 2024-04-17 NOTE — PROGRESS NOTE ADULT - SUBJECTIVE AND OBJECTIVE BOX
POD#1 S/P MARIA VICTORIA FOR A DISPLACED FN FX   T(C): 36.6 (04-17-24 @ 04:59), Max: 36.9 (04-16-24 @ 12:47)  HR: 78 (04-17-24 @ 04:59) (61 - 89)  BP: 137/60 (04-17-24 @ 04:59) (103/65 - 161/75)  RR: 18 (04-17-24 @ 04:59) (16 - 19)  SpO2: 99% (04-17-24 @ 04:59) (95% - 100%)                        13.0   7.97  )-----------( 157      ( 17 Apr 2024 06:33 )             37.7     04-17    135  |  99  |  19  ----------------------------<  121<H>  3.7   |  22  |  0.9    Ca    8.6      17 Apr 2024 06:33  Mg     1.9     04-17    TPro  7.2  /  Alb  4.8  /  TBili  0.9  /  DBili  x   /  AST  19  /  ALT  11  /  AlkPhos  62  04-15    PT/INR - ( 15 Apr 2024 15:49 )   PT: 11.50 sec;   INR: 1.01 ratio         PTT - ( 15 Apr 2024 15:49 )  PTT:34.8 sec  PTS PAIN IS CONTROLLED   WOUND: Aquacel is in place   N/V/I  ABX COMPLETE  DVT PROPHYLAXIS IN PLACE LOV ASA WE CAN TRANSITION TO ASA 81 BID ONCE THE PT IS AMBULATING > 100FT   REHAB IN PROGRESS WBAT   D/C PLAN PENDING

## 2024-04-17 NOTE — PHYSICAL THERAPY INITIAL EVALUATION ADULT - GENERAL OBSERVATIONS, REHAB EVAL
10:02-10:28 Chart reviewed. Patient available to be seen for physical therapy, denies pain, confirmed with RN.  Pt rec'd in recliner +R hip Aquacel, +primafit pt in NAD.

## 2024-04-17 NOTE — PHYSICAL THERAPY INITIAL EVALUATION ADULT - PERTINENT HX OF CURRENT PROBLEM, REHAB EVAL
History of Present Illness:   89yo F w/ pmhx of HTN and L femoral neck fracture 2021 s/p MARIA VICTORIA with Dr. Matamoros presents to ED for mechanical fall and R hip fracture. Patient was attempting [place] sit down at park and fell. Denies syncope or any other prodromal symptoms as reason for fall. No  HT or LOC. Only reports R hip pain mainly when she moves her leg. Does not currently use any assistive device while walking and is proficient with ADLs.

## 2024-04-17 NOTE — PHYSICAL THERAPY INITIAL EVALUATION ADULT - ADDITIONAL COMMENTS
Pt reports she lives alone in house with 1 LISA then elevator. Pt says she amb independently without device PTA.

## 2024-04-17 NOTE — OCCUPATIONAL THERAPY INITIAL EVALUATION ADULT - ADDITIONAL COMMENTS
Difficulty concentrating/Difficulty making decisions/Difficulty remembering PLOF obtained from pt. Pt owns RW, cane, shower chair, reacher. Pt stated that she was uncertain if she owns a commode.  (-) driving

## 2024-04-17 NOTE — OCCUPATIONAL THERAPY INITIAL EVALUATION ADULT - PERTINENT HX OF CURRENT PROBLEM, REHAB EVAL
89yo F w/ pmhx of HTN and L femoral neck fracture 2021 s/p MARIA VICTORIA with Dr. Matamoros presents to ED for mechanical fall and R hip fracture. Patient was attempting to sit down at park and fell. Denies syncope or any other prodromal symptoms as reason for fall. No  HT or LOC. Only reports R hip pain mainly when she moves her leg. Does not currently use any assistive device while walking and is proficient with ADLs.     CT pelvis 4/15: Acute subcapital fracture of the right femoral neck. Increased size of infrarenal abdominal aortic aneurysm measuring up to 4.3 cm.  CT head 4/15: No evidence of acute intracranial hemorrhage. Chronic infarcts within the right parietal/temporal lobes and bilateral cerebellar hemispheres. Age indeterminate lacunar infarcts within bilateral deep gray nuclei. Mild/moderate chronic microvascular changes.  CT cervical spine 4/15: No evidence of acute cervical spine fracture or subluxation. Mild degenerative changes.    Pt s/p right total hip replacement 4/16/24 POD#1; regional anesthesia

## 2024-04-17 NOTE — OCCUPATIONAL THERAPY INITIAL EVALUATION ADULT - GENERAL OBSERVATIONS, REHAB EVAL
11:25-11:50; chart reviewed, ok to treat by Occupational Therapist as confirmed by RN Shi, Pt received seated in ASU recliner +aquacel right hip +LUE heplock +primafit in NAD. Pt denied pain at rest and in agreement with OT IE.

## 2024-04-17 NOTE — PROGRESS NOTE ADULT - SUBJECTIVE AND OBJECTIVE BOX
GRZEGORZ BURT  89y  [facility] 4I 018 A      Patient is a 89y old  Female who presents with a chief complaint of Right femoral neck fracture (16 Apr 2024 05:54)      INTERVAL HPI/OVERNIGHT EVENTS:    Patient feels well .  pain upon moving  otherwise, no complains   no overnight events       FAMILY HISTORY:    T(C): 37.4 (04-16-24 @ 04:53), Max: 37.4 (04-16-24 @ 04:53)  HR: 83 (04-16-24 @ 04:53) (78 - 83)  BP: 160/86 (04-16-24 @ 04:53) (116/89 - 160/86)  RR: 18 (04-16-24 @ 04:53) (18 - 18)  SpO2: 98% (04-16-24 @ 04:53) (96% - 98%)  Wt(kg): --Vital Signs Last 24 Hrs  T(C): 37.4 (16 Apr 2024 04:53), Max: 37.4 (16 Apr 2024 04:53)  T(F): 99.4 (16 Apr 2024 04:53), Max: 99.4 (16 Apr 2024 04:53)  HR: 83 (16 Apr 2024 04:53) (78 - 83)  BP: 160/86 (16 Apr 2024 04:53) (116/89 - 160/86)  BP(mean): --  RR: 18 (16 Apr 2024 04:53) (18 - 18)  SpO2: 98% (16 Apr 2024 04:53) (96% - 98%)    Parameters below as of 16 Apr 2024 04:53  Patient On (Oxygen Delivery Method): room air        PHYSICAL EXAM:  GENERAL: NAD, well-groomed, well-developed  NERVOUS SYSTEM:  Alert & Oriented X3,  PULM: Clear to auscultation bilaterally  CARDIAC: Regular rate and rhythm;  GI: Soft, Nontender, Nondistended; Bowel sounds present  EXTREMITIES:  2+ Peripheral Pulses,     Consultant(s) Notes Reviewed:  [x ] YES  [ ] NO  Care Discussed with Consultants/Other Providers [ x] YES  [ ] NO    LABS:                            14.8   10.31 )-----------( 212      ( 15 Apr 2024 15:49 )             44.4   04-15    141  |  103  |  15  ----------------------------<  114<H>  4.3   |  25  |  0.8    Ca    9.5      15 Apr 2024 15:49    TPro  7.2  /  Alb  4.8  /  TBili  0.9  /  DBili  x   /  AST  19  /  ALT  11  /  AlkPhos  62  04-15            acetaminophen     Tablet .. 650 milliGRAM(s) Oral every 6 hours PRN  chlorhexidine 2% Cloths 1 Application(s) Topical <User Schedule>  enoxaparin Injectable 40 milliGRAM(s) SubCutaneous once  lisinopril 5 milliGRAM(s) Oral daily  metoprolol tartrate 25 milliGRAM(s) Oral two times a day        89yo F w/ pmhx of HTN and L femoral neck fracture 2021 s/p MARIA VICTORIA with Dr. Matamoros presents to ED for mechanical fall and R hip fracture. Admitted for R hip fracture and plan is for [place] for surgery by orthopedics tomorrow 4/16.     1. Mechanical fall complicated by Right hip fracture s/p surgical repair    - Admit to medicine                - CXR:WNL   - Lovenox 40mg sc daily (plan for at least 4 weeks)   - moderate risk patient for moderate risk surgery   - RCRI 0, class I risk, 3.9% risk for perioperative MACE; no need to assess METS  - s/p ortho eval in ED: Add on for R hip CRPP vs james vs total arthroplasty tomorrow 4/16/24; transfer to [facility]   - CT pelvis: Acute subcapital fracture of the right femoral neck.  - PT/OT/physiatry:pending    2.AAA/HTN  - CT pelvis: Increased size of infrarenal abdominal aortic aneurysm measuring up to 4.3 cm; f/u o/p  - c/w lisinopril 5mg qd and metoprolol tartrate 25mg bid     DVT ppx: lovenox and SCDs  Dispo: from home, walks independently;      GRZEGORZ BURT  89y  [facility] 4I 018 A      Patient is a 89y old  Female who presents with a chief complaint of Right femoral neck fracture (16 Apr 2024 05:54)      INTERVAL HPI/OVERNIGHT EVENTS:    Patient feels well .    Pain is well controlled. no constipation   no overnight events.       FAMILY HISTORY:    T(C): 37.4 (04-16-24 @ 04:53), Max: 37.4 (04-16-24 @ 04:53)  HR: 83 (04-16-24 @ 04:53) (78 - 83)  BP: 160/86 (04-16-24 @ 04:53) (116/89 - 160/86)  RR: 18 (04-16-24 @ 04:53) (18 - 18)  SpO2: 98% (04-16-24 @ 04:53) (96% - 98%)  Wt(kg): --Vital Signs Last 24 Hrs  T(C): 37.4 (16 Apr 2024 04:53), Max: 37.4 (16 Apr 2024 04:53)  T(F): 99.4 (16 Apr 2024 04:53), Max: 99.4 (16 Apr 2024 04:53)  HR: 83 (16 Apr 2024 04:53) (78 - 83)  BP: 160/86 (16 Apr 2024 04:53) (116/89 - 160/86)  BP(mean): --  RR: 18 (16 Apr 2024 04:53) (18 - 18)  SpO2: 98% (16 Apr 2024 04:53) (96% - 98%)    Parameters below as of 16 Apr 2024 04:53  Patient On (Oxygen Delivery Method): room air        PHYSICAL EXAM:  GENERAL: NAD, well-groomed, well-developed  NERVOUS SYSTEM:  Alert & Oriented X3,  PULM: Clear to auscultation bilaterally  CARDIAC: Regular rate and rhythm;  GI: Soft, Nontender, Nondistended; Bowel sounds present  EXTREMITIES:  2+ Peripheral Pulses,     Consultant(s) Notes Reviewed:  [x ] YES  [ ] NO  Care Discussed with Consultants/Other Providers [ x] YES  [ ] NO    LABS:                            14.8   10.31 )-----------( 212      ( 15 Apr 2024 15:49 )             44.4   04-15    141  |  103  |  15  ----------------------------<  114<H>  4.3   |  25  |  0.8    Ca    9.5      15 Apr 2024 15:49    TPro  7.2  /  Alb  4.8  /  TBili  0.9  /  DBili  x   /  AST  19  /  ALT  11  /  AlkPhos  62  04-15            acetaminophen     Tablet .. 650 milliGRAM(s) Oral every 6 hours PRN  chlorhexidine 2% Cloths 1 Application(s) Topical <User Schedule>  enoxaparin Injectable 40 milliGRAM(s) SubCutaneous once  lisinopril 5 milliGRAM(s) Oral daily  metoprolol tartrate 25 milliGRAM(s) Oral two times a day        89yo F w/ pmhx of HTN and L femoral neck fracture 2021 s/p MARIA VICTORIA with Dr. Matamoros presents to ED for mechanical fall and R hip fracture. Admitted for R hip fracture and plan is for transfer south for surgery by orthopedics tomorrow 4/16.     1. Mechanical fall complicated by Right hip fracture s/p surgical repair    - Admit to medicine                - CXR:WNL  . Repeat CXR due to elevated BNP post-op:pending       - Lovenox 40mg sc daily (plan for at least 4 weeks)   - moderate risk patient for moderate risk surgery   - RCRI 0, class I risk, 3.9% risk for perioperative MACE; no need to assess METS  - s/p ortho eval in ED: Add on for R hip CRPP vs james vs total arthroplasty tomorrow 4/16/24; transfer to [facility]   - CT pelvis: Acute subcapital fracture of the right femoral neck.  - PT/OT/physiatry:  a)  Rehab   * pt is interested in acute rehab     2.AAA/HTN  - CT pelvis: Increased size of infrarenal abdominal aortic aneurysm measuring up to 4.3 cm; f/u o/p  - c/w lisinopril 5mg qd and metoprolol tartrate 25mg bid     DVT ppx: lovenox and SCDs  Dispo: from home, walks independently;

## 2024-04-18 ENCOUNTER — TRANSCRIPTION ENCOUNTER (OUTPATIENT)
Age: 89
End: 2024-04-18

## 2024-04-18 PROCEDURE — 71250 CT THORAX DX C-: CPT | Mod: 26

## 2024-04-18 RX ADMIN — SENNA PLUS 2 TABLET(S): 8.6 TABLET ORAL at 22:16

## 2024-04-18 RX ADMIN — Medication 25 MILLIGRAM(S): at 17:33

## 2024-04-18 RX ADMIN — Medication 81 MILLIGRAM(S): at 17:33

## 2024-04-18 RX ADMIN — LISINOPRIL 5 MILLIGRAM(S): 2.5 TABLET ORAL at 05:03

## 2024-04-18 RX ADMIN — POLYETHYLENE GLYCOL 3350 17 GRAM(S): 17 POWDER, FOR SOLUTION ORAL at 17:36

## 2024-04-18 RX ADMIN — Medication 25 MILLIGRAM(S): at 05:03

## 2024-04-18 RX ADMIN — CHLORHEXIDINE GLUCONATE 1 APPLICATION(S): 213 SOLUTION TOPICAL at 05:15

## 2024-04-18 NOTE — DISCHARGE NOTE PROVIDER - ATTENDING DISCHARGE PHYSICAL EXAMINATION:
VITALS:   T(C): 36.3 (04-19-24 @ 05:00), Max: 36.3 (04-19-24 @ 05:00)  HR: 79 (04-19-24 @ 05:00) (79 - 92)  BP: 140/65 (04-19-24 @ 05:00) (140/65 - 155/71)  RR: 18 (04-19-24 @ 05:00) (18 - 18)  SpO2: 97% (04-19-24 @ 05:00) (97% - 99%)    GENERAL: NAD, lying in bed comfortably  HEART: Regular rate and rhythm,  LUNGS: Unlabored respirations.  Clear to auscultation bilaterally,  ABDOMEN: Soft, nontender, nondistended, +BS  EXTREMITIES: 2+ peripheral pulses bilaterall  NERVOUS SYSTEM:  A&Ox3,

## 2024-04-18 NOTE — DISCHARGE NOTE PROVIDER - PROVIDER TOKENS
PROVIDER:[TOKEN:[[id number]],FOLLOWUP:[1 week]],PROVIDER:[TOKEN:[[id number]],FOLLOWUP:[2 weeks]]

## 2024-04-18 NOTE — DISCHARGE NOTE PROVIDER - CARE PROVIDER_API CALL
Didi Jacobsen  Internal Medicine  [address] [place] [address]  Phone: [phone]  Fax: [phone]  Follow Up Time: 1 week    Hip-Alirio John  Orthopaedic Surgery  [address] [place] [id number]  Phone: [phone]  Fax: [phone]  Follow Up Time: 2 weeks

## 2024-04-18 NOTE — CONSULT NOTE ADULT - ASSESSMENT
IMPRESSION: Rehab of 89 y/o  f  rehab   for  rt hip  fx  sp  yeimy     PRECAUTIONS: [  ] Cardiac  [  ] Respiratory  [  ] Seizures [  ] Contact Isolation  [  ] Droplet Isolation  [ FALL ] Other    Weight Bearing Status:     RECOMMENDATION:    Out of Bed to Chair     DVT/Decubiti Prophylaxis    REHAB PLAN:     [  x ] Bedside P/T 3-5 times a week   [ x  ]   Bedside O/T  2-3 times a week             [   ] No Rehab Therapy Indicated                   [   ]  Speech Therapy   Conditioning/ROM                                    ADL  Bed Mobility                                               Conditioning/ROM  Transfers                                                     Bed Mobility  Sitting /Standing Balance                         Transfers                                        Gait Training                                               Sitting/Standing Balance  Stair Training [   ]Applicable                    Home equipment Eval                                                                        Splinting  [   ] Only      GOALS:   ADL   [  x ]   Independent                    Transfers  [ x ] Independent                          Ambulation  [x   ] Independent     [    ] With device                            [  x ]  CG                                                         [ x  ]  CG                                                                  [ x  ] CG                            [    ] Min A                                                   [   ] Min A                                                              [   ] Min  A          DISCHARGE PLAN:   [   ]  Good candidate for Intensive Rehabilitation/Hospital based-4A [facility]                                             Will tolerate 3hrs Intensive Rehab Daily                                       [   xx ]  Short Term Rehab in Skilled Nursing Facility d/w  ptn rehab  plan  ptn agree                                       [    ]  Home with Outpatient or VN services                                         [    ]  Possible Candidate for Intensive Hospital based Rehab

## 2024-04-18 NOTE — CHART NOTE - NSCHARTNOTEFT_GEN_A_CORE
bladder US 550ml. Pt asked to try to void , but only  few drops came out.  Plan Straight cath .  see Post residual  recommend ambulation bladder US 550ml. Pt asked to try to void , but only  few drops came out.  Plan Straight cath .  see Post void residual 750ml  recommend ambulation

## 2024-04-18 NOTE — DISCHARGE NOTE PROVIDER - NSDCCPCAREPLAN_GEN_ALL_CORE_FT
PRINCIPAL DISCHARGE DIAGNOSIS  Diagnosis: Hip fracture  Assessment and Plan of Treatment: you had surgery done by Dr. Matamoros  please ambulate as tolerated  weaight bearing as tolerated  continue aspirin twice a day for dvt prophylaxis  follow up with Dr. Matamoros in 2 weeks      SECONDARY DISCHARGE DIAGNOSES  Diagnosis: History of abdominal aortic aneurysm (AAA)  Assessment and Plan of Treatment: increased size of infrarenal abdominal aortic aneurysm measuring up to 4.3 cm on CT pelvis was noted  please follow up as outpatiet with your vascular surgeon

## 2024-04-18 NOTE — DISCHARGE NOTE PROVIDER - HOSPITAL COURSE
89yo F w/ pmhx of HTN and L femoral neck fracture 2021 s/p MARIA VICTORIA with Dr. Matamoros presents to ED for mechanical fall and R hip fracture.   Pt was admitted to medicine for R femora neck fracture.   She is status post right hip replacement on 16-Apr-2024 by Dr. Matamoros. She was transitioned to ASA bid for dvt ppx.  She was seen by physiatry who recommended Short Term Rehab in Skilled Nursing Facility.  Pt was also noted increased size of infrarenal abdominal aortic aneurysm measuring up to 4.3 cm on CT pelvis and was recommended outpatient follow up with vascular.    89yo F w/ pmhx of HTN and L femoral neck fracture  s/p MARIA VICTORIA with Dr. Matamoros presents to ED for mechanical fall and R hip fracture. Admitted for R hip fracture and plan is for transfer south for surgery by orthopedics tomorrow .     1. Mechanical fall complicated by Right hip fracture s/p surgical repair    - Admit to medicine                - CXR: Hilar prominence   - bladder scan no retention   - Lovenox 40mg sc daily (plan for at least 4 weeks) . DC on lovenox for 4 more weeks   - DC on pain meds prn with laxative   - moderate risk patient for moderate risk surgery   - RCRI 0, class I risk, 3.9% risk for perioperative MACE; no need to assess METS  - s/p ortho eval in ED: Add on for R hip CRPP vs james vs total arthroplasty tomorrow 24; transfer to [facility]   - CT pelvis: Acute subcapital fracture of the right femoral neck.  - CT chest:  a) Small left effusion with compressive atelectasis  b) 4 mm groundglass nodule. No routine follow-up is required.  c) Multiple scattered small micronodules.  - PT/OT/physiatry:  a)  Rehab     2.AAA/HTN  - CT pelvis: Increased size of infrarenal abdominal aortic aneurysm measuring up to 4.3 cm; f/u o/p  - c/w lisinopril 5mg qd and metoprolol tartrate 25mg bid     3. Incidental findinmm ground glass nodule   - Follow up with PMD     4. Constipation resolved as per nursing  - Keep on laxative

## 2024-04-18 NOTE — PROGRESS NOTE ADULT - SUBJECTIVE AND OBJECTIVE BOX
GRZEGORZ BURT  89y  [facility] 4I 018 A      Patient is a 89y old  Female who presents with a chief complaint of Right femoral neck fracture (16 Apr 2024 05:54)      INTERVAL HPI/OVERNIGHT EVENTS:    Patient feels well .    Pain is well controlled. no constipation   no overnight events.       FAMILY HISTORY:    T(C): 37.4 (04-16-24 @ 04:53), Max: 37.4 (04-16-24 @ 04:53)  HR: 83 (04-16-24 @ 04:53) (78 - 83)  BP: 160/86 (04-16-24 @ 04:53) (116/89 - 160/86)  RR: 18 (04-16-24 @ 04:53) (18 - 18)  SpO2: 98% (04-16-24 @ 04:53) (96% - 98%)  Wt(kg): --Vital Signs Last 24 Hrs  T(C): 37.4 (16 Apr 2024 04:53), Max: 37.4 (16 Apr 2024 04:53)  T(F): 99.4 (16 Apr 2024 04:53), Max: 99.4 (16 Apr 2024 04:53)  HR: 83 (16 Apr 2024 04:53) (78 - 83)  BP: 160/86 (16 Apr 2024 04:53) (116/89 - 160/86)  BP(mean): --  RR: 18 (16 Apr 2024 04:53) (18 - 18)  SpO2: 98% (16 Apr 2024 04:53) (96% - 98%)    Parameters below as of 16 Apr 2024 04:53  Patient On (Oxygen Delivery Method): room air        PHYSICAL EXAM:  GENERAL: NAD, well-groomed, well-developed  NERVOUS SYSTEM:  Alert & Oriented X3,  PULM: Clear to auscultation bilaterally  CARDIAC: Regular rate and rhythm;  GI: Soft, Nontender, Nondistended; Bowel sounds present  EXTREMITIES:  2+ Peripheral Pulses,     Consultant(s) Notes Reviewed:  [x ] YES  [ ] NO  Care Discussed with Consultants/Other Providers [ x] YES  [ ] NO    LABS:                            14.8   10.31 )-----------( 212      ( 15 Apr 2024 15:49 )             44.4   04-15    141  |  103  |  15  ----------------------------<  114<H>  4.3   |  25  |  0.8    Ca    9.5      15 Apr 2024 15:49    TPro  7.2  /  Alb  4.8  /  TBili  0.9  /  DBili  x   /  AST  19  /  ALT  11  /  AlkPhos  62  04-15            acetaminophen     Tablet .. 650 milliGRAM(s) Oral every 6 hours PRN  chlorhexidine 2% Cloths 1 Application(s) Topical <User Schedule>  enoxaparin Injectable 40 milliGRAM(s) SubCutaneous once  lisinopril 5 milliGRAM(s) Oral daily  metoprolol tartrate 25 milliGRAM(s) Oral two times a day        89yo F w/ pmhx of HTN and L femoral neck fracture 2021 s/p MARIA VICTORIA with Dr. Matamoros presents to ED for mechanical fall and R hip fracture. Admitted for R hip fracture and plan is for transfer south for surgery by orthopedics tomorrow 4/16.     1. Mechanical fall complicated by Right hip fracture s/p surgical repair    - Admit to medicine                - CXR:WNL  . Repeat CXR due to elevated BNP post-op:pending       - Lovenox 40mg sc daily (plan for at least 4 weeks)   - moderate risk patient for moderate risk surgery   - RCRI 0, class I risk, 3.9% risk for perioperative MACE; no need to assess METS  - s/p ortho eval in ED: Add on for R hip CRPP vs james vs total arthroplasty tomorrow 4/16/24; transfer to [facility]   - CT pelvis: Acute subcapital fracture of the right femoral neck.  - PT/OT/physiatry:  a)  Rehab   * pt is interested in acute rehab     2.AAA/HTN  - CT pelvis: Increased size of infrarenal abdominal aortic aneurysm measuring up to 4.3 cm; f/u o/p  - c/w lisinopril 5mg qd and metoprolol tartrate 25mg bid     DVT ppx: lovenox and SCDs  Dispo: from home, walks independently;      [facility]GORZ BURT  89y  [facility]      Patient is a 89y old  Female who presents with a chief complaint of Right femoral neck fracture (16 Apr 2024 05:54)      INTERVAL HPI/OVERNIGHT EVENTS:    Patient feels well.    Pain is well controlled.   no overnight events.   Noted to be retaining urine this AM > repeat bladder scan at 12PM  Pending CT chest based on CXR findings and Radiology Attending recommendations         FAMILY HISTORY:    Vital Signs Last 24 Hrs  T(C): 37.2 (18 Apr 2024 12:25), Max: 38.8 (17 Apr 2024 20:25)  T(F): 98.9 (18 Apr 2024 12:25), Max: 101.8 (17 Apr 2024 20:25)  HR: 92 (18 Apr 2024 17:36) (74 - 92)  BP: 148/70 (18 Apr 2024 17:36) (148/70 - 161/81)  BP(mean): --  RR: 16 (18 Apr 2024 12:25) (16 - 18)  SpO2: 96% (18 Apr 2024 12:25) (95% - 97%)    Parameters below as of 18 Apr 2024 04:49  Patient On (Oxygen Delivery Method): room air            PHYSICAL EXAM:  GENERAL: NAD, well-groomed, well-developed  NERVOUS SYSTEM:  Alert & Oriented X3,  PULM: Clear to auscultation bilaterally  CARDIAC: Regular rate and rhythm; + murmur   GI: Soft, Nontender, Nondistended; Bowel sounds present  EXTREMITIES:  2+ Peripheral Pulses,     Consultant(s) Notes Reviewed:  [x ] YES  [ ] NO  Care Discussed with Consultants/Other Providers [ x] YES  [ ] NO    LABS:                                       13.0   7.97  )-----------( 157      ( 17 Apr 2024 06:33 )             37.7   04-17    135  |  99  |  19  ----------------------------<  121<H>  3.7   |  22  |  0.9    Ca    8.6      17 Apr 2024 06:33  Mg     1.9     04-17        MEDICATIONS  (STANDING):  aspirin enteric coated 81 milliGRAM(s) Oral every 12 hours  chlorhexidine 2% Cloths 1 Application(s) Topical <User Schedule>  lisinopril 5 milliGRAM(s) Oral daily  metoprolol tartrate 25 milliGRAM(s) Oral two times a day  polyethylene glycol 3350 17 Gram(s) Oral daily  senna 2 Tablet(s) Oral at bedtime    MEDICATIONS  (PRN):  acetaminophen     Tablet .. 650 milliGRAM(s) Oral every 6 hours PRN Temp greater or equal to 38C (100.4F), Mild Pain (1 - 3)  oxyCODONE    IR 5 milliGRAM(s) Oral every 6 hours PRN Severe Pain (7 - 10)        89yo F w/ pmhx of HTN and L femoral neck fracture 2021 s/p MARIA VICTORIA with Dr. Matamoros presents to ED for mechanical fall and R hip fracture. Admitted for R hip fracture and plan is for transfer south for surgery by orthopedics tomorrow 4/16.     1. Mechanical fall complicated by Right hip fracture s/p surgical repair    - Admit to medicine                - CXR: Hilar prominence   - f/u CT chest   - bladder scan 12PM to assess for continued retention  - Lovenox 40mg sc daily (plan for at least 4 weeks)   - moderate risk patient for moderate risk surgery   - RCRI 0, class I risk, 3.9% risk for perioperative MACE; no need to assess METS  - s/p ortho eval in ED: Add on for R hip CRPP vs james vs total arthroplasty tomorrow 4/16/24; [facility]   - CT pelvis: Acute subcapital fracture of the right femoral neck.  - PT/OT/physiatry:  a)  Rehab   * pt is interested in acute rehab     2.AAA/HTN  - CT pelvis: Increased size of infrarenal abdominal aortic aneurysm measuring up to 4.3 cm; f/u o/p  - c/w lisinopril 5mg qd and metoprolol tartrate 25mg bid     DVT ppx: lovenox and SCDs  Dispo: [facility]. Pt appears to not have had a dexa scan in the past and has had multiple fractures. Family informed to attain medical records from prior PCP that was not [facility]. As per the family, pt has not had one.

## 2024-04-18 NOTE — CONSULT NOTE ADULT - SUBJECTIVE AND OBJECTIVE BOX
HPI:89 yo F w/ pmhx of HTN and L femoral neck fracture 2021 s/p MARIA VICTORIA with Dr. Matamoros presents to ED for mechanical fall and R hip fracture. Patient was [place] down [place] park and fell. Denies syncope or any other prodromal symptoms as reason for fall. No  HT or LOC. Only reports R hip pain mainly when she moves her leg. Does not currently use any assistive device while walking and is proficient with ADLs.     ED course:  vitals: afebrile, HR 78, /86, O2 normal RA  labs: unremarkable  imaging:   - CT C-spine: no fracture  - CT head: no acute pathology  - CT pelvis: Acute subcapital fracture of the right femoral neck. Increased size of infrarenal abdominal aortic aneurysm measuring up to 4.3 cm.      pt n seen and exam  at  bed  philomena e aox 3  pleasent  ptn  rt  hip stapless  in palce  ptn  pod#2    PTN  REFERRED TO ACUTE  REHAB  FOR  EVAL AND  TX   PAST MEDICAL & SURGICAL HISTORY:  Hypertension      S/P total left hip arthroplasty          Hospital Course:    TODAY'S SUBJECTIVE & REVIEW OF SYMPTOMS:     Constitutional WNL   Cardio WNL   Resp WNL   GI WNL  Heme WNL  Endo WNL  Skin WNL  MSK WNL  Neuro WNL  Cognitive WNL  Psych WNL      MEDICATIONS  (STANDING):  aspirin enteric coated 81 milliGRAM(s) Oral every 12 hours  chlorhexidine 2% Cloths 1 Application(s) Topical <User Schedule>  enoxaparin Injectable 40 milliGRAM(s) SubCutaneous every 24 hours  lisinopril 5 milliGRAM(s) Oral daily  metoprolol tartrate 25 milliGRAM(s) Oral two times a day  polyethylene glycol 3350 17 Gram(s) Oral daily  senna 2 Tablet(s) Oral at bedtime    MEDICATIONS  (PRN):  acetaminophen     Tablet .. 650 milliGRAM(s) Oral every 6 hours PRN Temp greater or equal to 38C (100.4F), Mild Pain (1 - 3)  oxyCODONE    IR 5 milliGRAM(s) Oral every 6 hours PRN Severe Pain (7 - 10)      FAMILY HISTORY:      Allergies    Shrimp (Rash)  NKDA (Unknown)    Intolerances        SOCIAL HISTORY:    [  ] Etoh  [  ] Smoking  [  ] Substance abuse     Home Environment:  [ x ] Home Alone  [  ] Lives with Family  [  ] Home Health Aid    Dwelling:  [  ] Apartment  [ x ] Private House  [  ] Adult Home  [  ] Skilled Nursing Facility      [  ] Short Term  [  ] Long Term  [ x ] Stairs       Elevator [  ]    FUNCTIONAL STATUS PTA: (Check all that apply)  Ambulation: [x   ]Independent    [  ] Dependent     [  ] Non-Ambulatory  Assistive Device: [  ] SA Cane  [  ]  Q Cane  [x] Walker  [  ]  Wheelchair  ADL : [x  ] Independent  [  ]  Dependent       Vital Signs Last 24 Hrs  T(C): 36.7 (18 Apr 2024 04:49), Max: 38.8 (17 Apr 2024 20:25)  T(F): 98.1 (18 Apr 2024 04:49), Max: 101.8 (17 Apr 2024 20:25)  HR: 78 (18 Apr 2024 04:49) (74 - 98)  BP: 158/73 (18 Apr 2024 04:49) (130/75 - 161/81)  BP(mean): --  RR: 18 (18 Apr 2024 04:49) (16 - 18)  SpO2: 95% (18 Apr 2024 04:49) (95% - 98%)    Parameters below as of 18 Apr 2024 04:49  Patient On (Oxygen Delivery Method): room air          PHYSICAL EXAM: Alert & Oriented X3  GENERAL: NAD, well-groomed, well-developed  HEAD:  Atraumatic, Normocephalic  EYES: EOMI, PERRLA, conjunctiva and sclera clear  NECK: Supple, No JVD, Normal thyroid  CHEST/LUNG: Clear to percussion bilaterally; No rales, rhonchi, wheezing, or rubs  HEART: Regular rate and rhythm; No murmurs, rubs, or gallops  ABDOMEN: Soft, Nontender, Nondistended; Bowel sounds present  EXTREMITIES:  2+ Peripheral Pulses, No clubbing, cyanosis, or edema    NERVOUS SYSTEM:  Cranial Nerves 2-12 intact [  ] Abnormal  [  ]  ROM: WFL all extremities [  ]  Abnormal [  ]  Motor Strength: WFL all extremities  [  ]  Abnormal [  ]  Sensation: intact to light touch [  ] Abnormal [  ]  Reflexes: Symmetric [  ]  Abnormal [  ]    FUNCTIONAL STATUS:  Bed Mobility: Independent [  ]  Supervision [  ]  Needs Assistance [x  ]  N/A [  ]  Transfers: Independent [  ]  Supervision [  ]  Needs Assistance [x  ]  N/A [  ]   Ambulation: Independent [  ]  Supervision [  ]  Needs Assistance [x  ]  N/A [  ]  ADL: Independent [  ] Requires Assistance [  ] N/A [ x ]  SEE PT/OT IE NOTES    LABS:                        13.0   7.97  )-----------( 157      ( 17 Apr 2024 06:33 )             37.7     04-17    135  |  99  |  19  ----------------------------<  121<H>  3.7   |  22  |  0.9    Ca    8.6      17 Apr 2024 06:33  Mg     1.9     04-17        Urinalysis Basic - ( 17 Apr 2024 06:33 )    Color: x / Appearance: x / SG: x / pH: x  Gluc: 121 mg/dL / Ketone: x  / Bili: x / Urobili: x   Blood: x / Protein: x / Nitrite: x   Leuk Esterase: x / RBC: x / WBC x   Sq Epi: x / Non Sq Epi: x / Bacteria: x        RADIOLOGY & ADDITIONAL STUDIES:< from: CT Pelvis No Cont (04.15.24 @ 18:40) >    IMPRESSION:  Acute subcapital fracture of the right femoral neck.    Increased size of infrarenal abdominal aortic aneurysm measuring up to   4.3 cm.    < end of copied text >      Assesment:

## 2024-04-18 NOTE — DISCHARGE NOTE PROVIDER - NSDCMRMEDTOKEN_GEN_ALL_CORE_FT
lisinopril 5 mg oral tablet: 1 tab(s) orally once a day  Metoprolol Tartrate 25 mg oral tablet: 1 tab(s) orally 2 times a day   acetaminophen 325 mg oral tablet: 2 tab(s) orally every 6 hours As needed Temp greater or equal to 38C (100.4F), Mild Pain (1 - 3)  aspirin 81 mg oral delayed release tablet: 1 tab(s) orally every 12 hours  lisinopril 5 mg oral tablet: 1 tab(s) orally once a day  Metoprolol Tartrate 25 mg oral tablet: 1 tab(s) orally 2 times a day  oxyCODONE 5 mg oral tablet: 1 tab(s) orally every 6 hours As needed Severe Pain (7 - 10)  polyethylene glycol 3350 oral powder for reconstitution: 17 gram(s) orally once a day  senna leaf extract oral tablet: 2 tab(s) orally once a day (at bedtime)   acetaminophen 325 mg oral tablet: 2 tab(s) orally every 6 hours As needed Temp greater or equal to 38C (100.4F), Mild Pain (1 - 3)  aspirin 81 mg oral delayed release tablet: 1 tab(s) orally every 12 hours  lisinopril 5 mg oral tablet: 1 tab(s) orally once a day  Lovenox 40 mg/0.4 mL injectable solution: 40 milligram(s) subcutaneously once a day  Metoprolol Tartrate 25 mg oral tablet: 1 tab(s) orally 2 times a day  oxyCODONE 5 mg oral tablet: 1 tab(s) orally every 6 hours As needed Severe Pain (7 - 10)  polyethylene glycol 3350 oral powder for reconstitution: 17 gram(s) orally once a day  senna leaf extract oral tablet: 2 tab(s) orally once a day (at bedtime)

## 2024-04-19 ENCOUNTER — TRANSCRIPTION ENCOUNTER (OUTPATIENT)
Age: 89
End: 2024-04-19

## 2024-04-19 VITALS
SYSTOLIC BLOOD PRESSURE: 140 MMHG | DIASTOLIC BLOOD PRESSURE: 65 MMHG | RESPIRATION RATE: 18 BRPM | OXYGEN SATURATION: 97 % | HEART RATE: 79 BPM | TEMPERATURE: 97 F

## 2024-04-19 LAB
24R-OH-CALCIDIOL SERPL-MCNC: 24 NG/ML — LOW (ref 30–80)
ALBUMIN SERPL ELPH-MCNC: 3.1 G/DL — LOW (ref 3.5–5.2)
ALP SERPL-CCNC: 52 U/L — SIGNIFICANT CHANGE UP (ref 30–115)
ALT FLD-CCNC: 6 U/L — SIGNIFICANT CHANGE UP (ref 0–41)
ANION GAP SERPL CALC-SCNC: 11 MMOL/L — SIGNIFICANT CHANGE UP (ref 7–14)
AST SERPL-CCNC: 23 U/L — SIGNIFICANT CHANGE UP (ref 0–41)
BILIRUB SERPL-MCNC: 0.8 MG/DL — SIGNIFICANT CHANGE UP (ref 0.2–1.2)
BUN SERPL-MCNC: 12 MG/DL — SIGNIFICANT CHANGE UP (ref 10–20)
CALCIUM SERPL-MCNC: 8.2 MG/DL — LOW (ref 8.4–10.5)
CHLORIDE SERPL-SCNC: 103 MMOL/L — SIGNIFICANT CHANGE UP (ref 98–110)
CO2 SERPL-SCNC: 22 MMOL/L — SIGNIFICANT CHANGE UP (ref 17–32)
CREAT SERPL-MCNC: 0.7 MG/DL — SIGNIFICANT CHANGE UP (ref 0.7–1.5)
EGFR: 81 ML/MIN/1.73M2 — SIGNIFICANT CHANGE UP
GLUCOSE SERPL-MCNC: 106 MG/DL — HIGH (ref 70–99)
HCT VFR BLD CALC: 35 % — LOW (ref 37–47)
HGB BLD-MCNC: 12 G/DL — SIGNIFICANT CHANGE UP (ref 12–16)
MCHC RBC-ENTMCNC: 30.5 PG — SIGNIFICANT CHANGE UP (ref 27–31)
MCHC RBC-ENTMCNC: 34.3 G/DL — SIGNIFICANT CHANGE UP (ref 32–37)
MCV RBC AUTO: 88.8 FL — SIGNIFICANT CHANGE UP (ref 81–99)
NRBC # BLD: 0 /100 WBCS — SIGNIFICANT CHANGE UP (ref 0–0)
PLATELET # BLD AUTO: 183 K/UL — SIGNIFICANT CHANGE UP (ref 130–400)
PMV BLD: 9.7 FL — SIGNIFICANT CHANGE UP (ref 7.4–10.4)
POTASSIUM SERPL-MCNC: 3.8 MMOL/L — SIGNIFICANT CHANGE UP (ref 3.5–5)
POTASSIUM SERPL-SCNC: 3.8 MMOL/L — SIGNIFICANT CHANGE UP (ref 3.5–5)
PROT SERPL-MCNC: 5.1 G/DL — LOW (ref 6–8)
RBC # BLD: 3.94 M/UL — LOW (ref 4.2–5.4)
RBC # FLD: 13.1 % — SIGNIFICANT CHANGE UP (ref 11.5–14.5)
SODIUM SERPL-SCNC: 136 MMOL/L — SIGNIFICANT CHANGE UP (ref 135–146)
WBC # BLD: 6.22 K/UL — SIGNIFICANT CHANGE UP (ref 4.8–10.8)
WBC # FLD AUTO: 6.22 K/UL — SIGNIFICANT CHANGE UP (ref 4.8–10.8)

## 2024-04-19 PROCEDURE — 99239 HOSP IP/OBS DSCHRG MGMT >30: CPT

## 2024-04-19 RX ORDER — SENNA PLUS 8.6 MG/1
2 TABLET ORAL
Qty: 0 | Refills: 0 | DISCHARGE
Start: 2024-04-19

## 2024-04-19 RX ORDER — POLYETHYLENE GLYCOL 3350 17 G/17G
17 POWDER, FOR SOLUTION ORAL
Qty: 0 | Refills: 0 | DISCHARGE
Start: 2024-04-19

## 2024-04-19 RX ORDER — ACETAMINOPHEN 500 MG
2 TABLET ORAL
Qty: 0 | Refills: 0 | DISCHARGE
Start: 2024-04-19

## 2024-04-19 RX ORDER — OXYCODONE HYDROCHLORIDE 5 MG/1
1 TABLET ORAL
Qty: 0 | Refills: 0 | DISCHARGE
Start: 2024-04-19

## 2024-04-19 RX ORDER — ENOXAPARIN SODIUM 100 MG/ML
40 INJECTION SUBCUTANEOUS
Qty: 1 | Refills: 0
Start: 2024-04-19 | End: 2024-05-18

## 2024-04-19 RX ORDER — ASPIRIN/CALCIUM CARB/MAGNESIUM 324 MG
1 TABLET ORAL
Qty: 0 | Refills: 0 | DISCHARGE
Start: 2024-04-19

## 2024-04-19 RX ORDER — POLYETHYLENE GLYCOL 3350 17 G/17G
17 POWDER, FOR SOLUTION ORAL ONCE
Refills: 0 | Status: DISCONTINUED | OUTPATIENT
Start: 2024-04-19 | End: 2024-04-19

## 2024-04-19 RX ADMIN — Medication 25 MILLIGRAM(S): at 05:42

## 2024-04-19 RX ADMIN — POLYETHYLENE GLYCOL 3350 17 GRAM(S): 17 POWDER, FOR SOLUTION ORAL at 12:20

## 2024-04-19 RX ADMIN — Medication 81 MILLIGRAM(S): at 05:42

## 2024-04-19 RX ADMIN — LISINOPRIL 5 MILLIGRAM(S): 2.5 TABLET ORAL at 05:41

## 2024-04-19 NOTE — PROGRESS NOTE ADULT - REASON FOR ADMISSION
Right femoral neck fracture
femoral neck fx s/p Total Hip Arthroplasty
Femoral fracture
Femoral fracture

## 2024-04-19 NOTE — PROGRESS NOTE ADULT - SUBJECTIVE AND OBJECTIVE BOX
GRZEGORZ BURT  89[facility]      Patient is a 89y old  Female who presents with a chief complaint of Right femoral neck fracture (2024 05:54)      INTERVAL HPI/OVERNIGHT EVENTS:    Patient feels well.    Pain is well controlled.   no overnight events.   Noted to be retaining urine this AM > repeat bladder scan at 12PM  Pending CT chest based on CXR findings and Radiology Attending recommendations         FAMILY HISTORY:    Vital Signs Last 24 Hrs  T(C): 37.2 (2024 12:25), Max: 38.8 (2024 20:25)  T(F): 98.9 (2024 12:25), Max: 101.8 (2024 20:25)  HR: 92 (2024 17:36) (74 - 92)  BP: 148/70 (2024 17:36) (148/70 - 161/81)  BP(mean): --  RR: 16 (2024 12:25) (16 - 18)  SpO2: 96% (2024 12:25) (95% - 97%)    Parameters below as of 2024 04:49  Patient On (Oxygen Delivery Method): room air            PHYSICAL EXAM:  GENERAL: NAD, well-groomed, well-developed  NERVOUS SYSTEM:  Alert & Oriented X3,  PULM: Clear to auscultation bilaterally  CARDIAC: Regular rate and rhythm; + murmur   GI: Soft, Nontender, Nondistended; Bowel sounds present  EXTREMITIES:  2+ Peripheral Pulses,     Consultant(s) Notes Reviewed:  [x ] YES  [ ] NO  Care Discussed with Consultants/Other Providers [ x] YES  [ ] NO    LABS:                                       13.0   7.97  )-----------( 157      ( 2024 06:33 )             37.7   04-17    135  |  99  |  19  ----------------------------<  121<H>  3.7   |  22  |  0.9    Ca    8.6      2024 06:33  Mg     1.9     04-17        MEDICATIONS  (STANDING):  aspirin enteric coated 81 milliGRAM(s) Oral every 12 hours  chlorhexidine 2% Cloths 1 Application(s) Topical <User Schedule>  lisinopril 5 milliGRAM(s) Oral daily  metoprolol tartrate 25 milliGRAM(s) Oral two times a day  polyethylene glycol 3350 17 Gram(s) Oral daily  senna 2 Tablet(s) Oral at bedtime    MEDICATIONS  (PRN):  acetaminophen     Tablet .. 650 milliGRAM(s) Oral every 6 hours PRN Temp greater or equal to 38C (100.4F), Mild Pain (1 - 3)  oxyCODONE    IR 5 milliGRAM(s) Oral every 6 hours PRN Severe Pain (7 - 10)        89yo F w/ pmhx of HTN and L femoral neck fracture  s/p MARIA VICTORIA with Dr. Matamoros presents to ED for mechanical fall and R hip fracture. Admitted for R hip fracture and [place] orthopedics tomorrow .     1. Mechanical fall complicated by Right hip fracture s/p surgical repair    - Admit to medicine                - CXR: Hilar prominence   - bladder scan 12PM to assess for continued retention  - Lovenox 40mg sc daily (plan for at least 4 weeks)   - moderate risk patient for moderate risk surgery   - RCRI 0, class I risk, 3.9% risk for perioperative MACE; no need to assess METS  - s/p ortho eval in ED: Add on for R hip CRPP vs james vs total arthroplasty tomorrow 24; transfer to [facility]   - CT pelvis: Acute subcapital fracture of the right femoral neck.  - CT chest:  a) Small left effusion with compressive atelectasis  b) 4 mm groundglass nodule. No routine follow-up is required.  c) Multiple scattered small micronodules.  - PT/OT/physiatry:  a)  Rehab     2.AAA/HTN  - CT pelvis: Increased size of infrarenal abdominal aortic aneurysm measuring up to 4.3 cm; f/u o/p  - c/w lisinopril 5mg qd and metoprolol tartrate 25mg bid     3. Incidental findinmm ground glass nodule   - Follow up with PMD     DVT ppx: lovenox and SCDs  Dispo: [facility]. Pt appears to not have had a dexa scan in the past and has had multiple fractures. Family informed to attain medical records from prior PCP that was not [facility]. As per the family, pt has not had one.

## 2024-04-19 NOTE — DISCHARGE NOTE NURSING/CASE MANAGEMENT/SOCIAL WORK - PATIENT PORTAL LINK FT
You can access the FollowMyHealth Patient Portal offered by [facility] by registering at the following website: [email or web address]. By joining [facility] FollowMyHealth portal, you will also be able to view your health information using other applications (apps) compatible with our system.

## 2024-04-19 NOTE — DISCHARGE NOTE NURSING/CASE MANAGEMENT/SOCIAL WORK - NSDCPEFALRISK_GEN_ALL_CORE
For information on Fall & Injury Prevention, visit: [email or web address] OR  [email or web address] OR  https://www.cdc.gov/steadi/patient.html

## 2024-04-19 NOTE — PROGRESS NOTE ADULT - SUBJECTIVE AND OBJECTIVE BOX
89 f s/p right yeimy for femoral neck fx pod 3    MEDICATIONS  (STANDING):  aspirin enteric coated 81 milliGRAM(s) Oral every 12 hours  chlorhexidine 2% Cloths 1 Application(s) Topical <User Schedule>  lisinopril 5 milliGRAM(s) Oral daily  metoprolol tartrate 25 milliGRAM(s) Oral two times a day  polyethylene glycol 3350 17 Gram(s) Oral daily  senna 2 Tablet(s) Oral at bedtime    MEDICATIONS  (PRN):  acetaminophen     Tablet .. 650 milliGRAM(s) Oral every 6 hours PRN Temp greater or equal to 38C (100.4F), Mild Pain (1 - 3)  oxyCODONE    IR 5 milliGRAM(s) Oral every 6 hours PRN Severe Pain (7 - 10)      Pt s/e at bedside, comfortable in chair, pain is well controlled, no other complaints    NAD    Vital Signs Last 24 Hrs  T(C): 36.3 (19 Apr 2024 05:00), Max: 37.2 (18 Apr 2024 12:25)  T(F): 97.3 (19 Apr 2024 05:00), Max: 98.9 (18 Apr 2024 12:25)  HR: 79 (19 Apr 2024 05:00) (79 - 92)  BP: 140/65 (19 Apr 2024 05:00) (140/65 - 155/71)  BP(mean): --  RR: 18 (19 Apr 2024 05:00) (16 - 18)  SpO2: 97% (19 Apr 2024 05:00) (96% - 99%)    Parameters below as of 19 Apr 2024 05:00  Patient On (Oxygen Delivery Method): room air    PE:  dressing d/c/i  compartments soft  NVID  calf soft, NT  foot wwp    PT, WBAT with walker  pain control  dvt ppx- asa 81 mg bid x30 days   GI ppx  management as per medicine  f/u with dr Matamoros as OP in 3 weeks, call for appointment [phone], [address] [facility]

## 2024-04-26 ENCOUNTER — TRANSCRIPTION ENCOUNTER (OUTPATIENT)
Age: 89
End: 2024-04-26

## 2024-04-26 DIAGNOSIS — Z79.82 LONG TERM (CURRENT) USE OF ASPIRIN: ICD-10-CM

## 2024-04-26 DIAGNOSIS — R91.8 OTHER NONSPECIFIC ABNORMAL FINDING OF LUNG FIELD: ICD-10-CM
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
## 2024-04-26 DIAGNOSIS — J98.11 ATELECTASIS: ICD-10-CM

## 2024-04-26 DIAGNOSIS — Z91.013 ALLERGY TO SEAFOOD: ICD-10-CM

## 2024-04-26 DIAGNOSIS — I71.43 INFRARENAL ABDOMINAL AORTIC ANEURYSM, WITHOUT RUPTURE: ICD-10-CM

## 2024-04-26 DIAGNOSIS — J90 PLEURAL EFFUSION, NOT ELSEWHERE CLASSIFIED: ICD-10-CM

## 2024-04-26 DIAGNOSIS — I10 ESSENTIAL (PRIMARY) HYPERTENSION: ICD-10-CM

## 2024-04-26 DIAGNOSIS — S72.011A UNSPECIFIED INTRACAPSULAR FRACTURE OF RIGHT FEMUR, INITIAL ENCOUNTER FOR CLOSED FRACTURE: ICD-10-CM

## 2024-04-26 DIAGNOSIS — Z96.642 PRESENCE OF LEFT ARTIFICIAL HIP JOINT: ICD-10-CM
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
## 2024-05-02 ENCOUNTER — RESULT CHARGE (OUTPATIENT)
Age: 89
End: 2024-05-02

## 2024-05-02 ENCOUNTER — APPOINTMENT (OUTPATIENT)
Dept: ORTHOPEDIC SURGERY | Facility: CLINIC | Age: 89
End: 2024-05-02
Payer: MEDICARE

## 2024-05-02 DIAGNOSIS — Z96.641 PRESENCE OF RIGHT ARTIFICIAL HIP JOINT: ICD-10-CM

## 2024-05-02 PROCEDURE — 73521 X-RAY EXAM HIPS BI 2 VIEWS: CPT | Mod: TC

## 2024-05-02 PROCEDURE — 99024 POSTOP FOLLOW-UP VISIT: CPT

## 2024-05-04 PROBLEM — Z96.641 HISTORY OF RIGHT HIP REPLACEMENT: Status: ACTIVE | Noted: 2024-05-04

## 2024-05-04 NOTE — HISTORY OF PRESENT ILLNESS
[de-identified] : s/p Right MARIA VICTORIA doing well postop course uncomplicated, home PT complete, progressing appropriately.  pain controlled (-)n/v/cp/sob  Right hip exam shows well healed incision NTTP No pain with PROM  Imaging:  AP and Lateral views were obtained of the hips, including the contralateral left hip for comparison and assessment of leg length. X-rays are negative for acute bone or soft tissue trauma. Right hip replacement in good alignment without radiographic evidence of complication.  Plan: continue home exercise activities as tolerated f/u at 6 weeks.

## 2024-05-07 ENCOUNTER — TRANSCRIPTION ENCOUNTER (OUTPATIENT)
Age: 89
End: 2024-05-07

## 2024-06-13 ENCOUNTER — APPOINTMENT (OUTPATIENT)
Dept: OTOLARYNGOLOGY | Facility: CLINIC | Age: 89
End: 2024-06-13
Payer: MEDICARE

## 2024-06-13 PROCEDURE — V5299A: CUSTOM

## 2024-07-11 ENCOUNTER — APPOINTMENT (OUTPATIENT)
Dept: OTOLARYNGOLOGY | Facility: CLINIC | Age: 89
End: 2024-07-11
Payer: MEDICARE

## 2024-07-11 PROCEDURE — V5299A: CUSTOM

## 2024-09-18 ENCOUNTER — APPOINTMENT (OUTPATIENT)
Dept: OTOLARYNGOLOGY | Facility: CLINIC | Age: 89
End: 2024-09-18
Payer: MEDICARE

## 2024-09-18 PROCEDURE — V5299A: CUSTOM

## 2024-11-11 ENCOUNTER — APPOINTMENT (OUTPATIENT)
Dept: OTOLARYNGOLOGY | Facility: CLINIC | Age: 89
End: 2024-11-11
Payer: SELF-PAY

## 2024-11-11 PROCEDURE — V5299A: CUSTOM

## 2025-01-23 ENCOUNTER — APPOINTMENT (OUTPATIENT)
Dept: OTOLARYNGOLOGY | Facility: CLINIC | Age: 89
End: 2025-01-23
Payer: MEDICARE

## 2025-01-23 PROCEDURE — V5299A: CUSTOM

## 2025-04-30 ENCOUNTER — APPOINTMENT (OUTPATIENT)
Dept: OTOLARYNGOLOGY | Facility: CLINIC | Age: 89
End: 2025-04-30
Payer: MEDICARE

## 2025-04-30 PROCEDURE — V5299A: CUSTOM

## 2025-05-22 NOTE — PHYSICAL THERAPY INITIAL EVALUATION ADULT - CRITERIA FOR SKILLED THERAPEUTIC INTERVENTIONS
Medication passed protocol.     Medication: lisinopril passed protocol.   Last office visit date: 3/27/25  Next appointment scheduled?: Yes      impairments found/functional limitations in following categories/rehab potential/therapy frequency/predicted duration of therapy intervention/anticipated equipment needs at discharge/anticipated discharge recommendation

## 2025-07-03 ENCOUNTER — APPOINTMENT (OUTPATIENT)
Dept: OTOLARYNGOLOGY | Facility: CLINIC | Age: 89
End: 2025-07-03
Payer: MEDICARE

## 2025-07-03 PROCEDURE — V5299A: CUSTOM
